# Patient Record
Sex: FEMALE | URBAN - METROPOLITAN AREA
[De-identification: names, ages, dates, MRNs, and addresses within clinical notes are randomized per-mention and may not be internally consistent; named-entity substitution may affect disease eponyms.]

---

## 2017-01-31 DIAGNOSIS — O26.90 PREGNANCY RELATED CONDITION, UNSPECIFIED TRIMESTER: Primary | ICD-10-CM

## 2017-02-09 ENCOUNTER — PRE VISIT (OUTPATIENT)
Dept: MATERNAL FETAL MEDICINE | Facility: CLINIC | Age: 37
End: 2017-02-09

## 2017-02-13 ENCOUNTER — HOSPITAL ENCOUNTER (OUTPATIENT)
Dept: ULTRASOUND IMAGING | Facility: CLINIC | Age: 37
Discharge: HOME | End: 2017-02-13
Attending: NURSE PRACTITIONER | Admitting: OBSTETRICS & GYNECOLOGY
Payer: MEDICAID

## 2017-02-13 ENCOUNTER — OFFICE VISIT (OUTPATIENT)
Dept: MATERNAL FETAL MEDICINE | Facility: CLINIC | Age: 37
End: 2017-02-13
Attending: NURSE PRACTITIONER
Payer: MEDICAID

## 2017-02-13 ENCOUNTER — OFFICE VISIT (OUTPATIENT)
Dept: INTERPRETER SERVICES | Facility: CLINIC | Age: 37
End: 2017-02-13

## 2017-02-13 DIAGNOSIS — O09.521 AMA (ADVANCED MATERNAL AGE) MULTIGRAVIDA 35+, FIRST TRIMESTER: Primary | ICD-10-CM

## 2017-02-13 DIAGNOSIS — O26.90 PREGNANCY RELATED CONDITION, UNSPECIFIED TRIMESTER: ICD-10-CM

## 2017-02-13 PROCEDURE — 96040 ZZH GENETIC COUNSELING, EACH 30 MINUTES: CPT | Mod: ZF | Performed by: GENETIC COUNSELOR, MS

## 2017-02-13 PROCEDURE — 40000791 ZZHCL STATISTIC VERIFI PRENATAL TRISOMY 21,18,13: Performed by: OBSTETRICS & GYNECOLOGY

## 2017-02-13 PROCEDURE — 76813 OB US NUCHAL MEAS 1 GEST: CPT

## 2017-02-13 PROCEDURE — T1013 SIGN LANG/ORAL INTERPRETER: HCPCS | Mod: U3,ZF

## 2017-02-13 PROCEDURE — 36415 COLL VENOUS BLD VENIPUNCTURE: CPT | Performed by: OBSTETRICS & GYNECOLOGY

## 2017-02-13 NOTE — MR AVS SNAPSHOT
"              After Visit Summary   2/13/2017    Joan Menezes    MRN: [id number]           Patient Information     Date Of Birth          1980        Visit Information        Provider Department      2/13/2017 8:45 AM Ara Augustin, JED [facility] Maternal Fetal Medicine [facility]        Today's Diagnoses     AMA (advanced maternal age) multigravida 35+, first trimester    -  1    Pregnancy related condition, unspecified trimester           Follow-ups after your visit        Future tests that were ordered for you today     Open Future Orders        Priority Expected Expires Ordered    Verifi Test by Saberr Routine  5/14/2017 2/13/2017            Who to contact     If you have questions or need follow up information about today's clinic visit or your schedule please contact [facility] MATERNAL FETAL MEDICINE [facility] directly at [phone].  Normal or non-critical lab and imaging results will be communicated to you by ModiFacehart, letter or phone within 4 business days after the clinic has received the results. If you do not hear from us within 7 days, please contact the clinic through ModiFacehart or phone. If you have a critical or abnormal lab result, we will notify you by phone as soon as possible.  Submit refill requests through FamilySkyline or call your pharmacy and they will forward the refill request to us. Please allow 3 business days for your refill to be completed.          Additional Information About Your Visit        ModiFacehart Information     FamilySkyline lets you send messages to your doctor, view your test results, renew your prescriptions, schedule appointments and more. To sign up, go to [email or web address] . Click on \"Log in\" on the left side of the screen, which will take you to the Welcome page. Then click on \"Sign up Now\" on the right side of the page.     You will be asked to enter the access code listed below, as well as some personal information. Please follow the directions " to create your username and password.     Your access code is: [email or web address]  Expires: 2017 10:03 AM     Your access code will  in 90 days. If you need help or a new code, please call your [facility] or [phone].        Care EveryWhere ID     This is your Care EveryWhere ID. This could be used by other organizations to access your [place] medical records  [id number]        Your Vitals Were     Last Period                   2016            Blood Pressure from Last 3 Encounters:   No data found for BP    Weight from Last 3 Encounters:   No data found for Wt              We Performed the Following     [facility] Genetic Counseling        Primary Care Provider    Md Other Clinic                Thank you!     Thank you for choosing [facility]  for your care. Our goal is always to provide you with excellent care. Hearing back from our patients is one way we can continue to improve our services. Please take a few minutes to complete the written survey that you may receive in the mail after your visit with us. Thank you!             Your Updated Medication List - Protect others around you: Learn how to safely use, store and throw away your medicines at www.disposemymeds.org.      Notice  As of 2017 10:03 AM    You have not been prescribed any medications.

## 2017-02-13 NOTE — PROGRESS NOTES
"Please see \"Imaging\" tab under \"Chart Review\" for details of today's US.    Gia Bautista, DO    "

## 2017-02-13 NOTE — MR AVS SNAPSHOT
After Visit Summary   2/13/2017    Joan Menezes    MRN: [id number]           Patient Information     Date Of Birth          1980        Visit Information        Provider Department      2/13/2017 10:00 AM Gia Bautista DO [facility] Maternal Fetal Medicine [facility]        Today's Diagnoses     AMA (advanced maternal age) multigravida 35+, first trimester    -  1       Follow-ups after your visit        Your next 10 appointments already scheduled     Mar 27, 2017  8:45 AM CDT   M US COMP with [id number]   [facility] Maternal Fetal Medicine Ultrasound - [facility])    [address]  [facility] [id number] [phone]           Wear comfortable clothes and leave your valuables at home.            Mar 27, 2017  9:15 AM CDT   Radiology MD with UR MFM MD   [facility] Maternal Fetal Medicine - [facility])    [address]  [facility] [address]   [phone]           Please arrive at the time given for your first appointment.  This visit is used internally to schedule the physician's time during your ultrasound.              Future tests that were ordered for you today     Open Future Orders        Priority Expected Expires Ordered    M US Comprehensive Single Routine  12/13/2017 2/13/2017            Who to contact     If you have questions or need follow up information about today's clinic visit or your schedule please contact [facility] MATERNAL FETAL MEDICINE [facility] directly at [phone].  Normal or non-critical lab and imaging results will be communicated to you by MyChart, letter or phone within 4 business days after the clinic has received the results. If you do not hear from us within 7 days, please contact the clinic through MyChart or phone. If you have a critical or abnormal lab result, we will notify you by phone as soon as possible.  Submit refill  "requests through SpectraScience or call your pharmacy and they will forward the refill request to us. Please allow 3 business days for your refill to be completed.          Additional Information About Your Visit        Kneebonehart Information     SpectraScience lets you send messages to your doctor, view your test results, renew your prescriptions, schedule appointments and more. To sign up, go to [email or web address] . Click on \"Log in\" on the left side of the screen, which will take you to the Welcome page. Then click on \"Sign up Now\" on the right side of the page.     You will be asked to enter the access code listed below, as well as some personal information. Please follow the directions to create your username and password.     Your access code is: [email or web address]  Expires: 2017 10:03 AM     Your access code will  in 90 days. If you need help or a new code, please call your [place] clinic or [phone].        Care EveryWhere ID     This is your Care EveryWhere ID. This could be used by other organizations to access your [place] medical records  [id number]        Your Vitals Were     Last Period                   2016            Blood Pressure from Last 3 Encounters:   No data found for BP    Weight from Last 3 Encounters:   No data found for Wt              We Performed the Following     Verifi Test by [facility]        Primary Care Provider    Md Other Clinic                Thank you!     Thank you for choosing [facility]  for your care. Our goal is always to provide you with excellent care. Hearing back from our patients is one way we can continue to improve our services. Please take a few minutes to complete the written survey that you may receive in the mail after your visit with us. Thank you!             Your Updated Medication List - Protect others around you: Learn how to safely use, store and throw away your medicines at www.disposemymeds.org.      Notice  As of " 2/13/2017 11:11 AM    You have not been prescribed any medications.

## 2017-02-13 NOTE — PROGRESS NOTES
[facility] Maternal [facility]etal [facility]  Genetic Counseling Consult    Patient: Joan Menezes YOB: 1980   Date of Service: 17      Joan Menezes was seen with the aid of a , along with her  Krish, at [facility] for genetic consultation to discuss the options for screening and testing for fetal chromosome abnormalities.  The indication for genetic counseling is advanced maternal age.        Impression/Plan:   1.  Joan had an ultrasound and blood draw for NIPT (Innatal test through [facility]).  Results are expected within 7-10 days, and will be available in [employer].  We will contact her with a  to discuss the results (detailed message on cell phone requested, per patient, also, Joan requests that we do disclose sex chromosome information. Joan also signed a consent for us to release results to her .  If she does not answer her phone, she would like us to leave her a detailed message and then to call Krish at [phone]), and a copy will be forwarded to the office of the referring OB provider.    2.  Maternal serum AFP (single marker screen) is recommended after 15 weeks to screen for open neural tube defects. A quad screen should not be performed.    3.  An 18-20 week comprehensive ultrasound is standard of care for all women 35 or older at delivery.    Pregnancy History:   /Parity:    Age at Delivery: 36 year old    2010:  (Daughter - Rachel), born in [place] [place]   ARDEN: 2017, by Last Menstrual Period  Gestational Age: 12w5d    No significant complications or exposures were reported in the current pregnancy.    Medical/Social History:   Joan s reported medical history is not expected to impact pregnancy management or risks to fetal development.  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  Her  also does kitchen food prep and similarly reports no exposures of concern.  They have been together for 3 years.  This is Krish's first baby.  They are both originally from [place].  Joan has lived her for 6 years and Krish for 10.  Both of their families are in [place].            Family History:   A three-generation pedigree was obtained, and is scanned under the  Media  tab.   The following significant findings were reported by Joan:    Childhood death was reported for 2 of Joan's brothers.  Joan reports that she has 6 brothers and 5 sisters.  2 of her brothers are twins and were reportedly born healthy but then both passed away at age 2.  This occurred ~40 years ago in [place].  Joan does not know further details.  We discussed that without a diagnosis/further details it is difficult to provide an accurate risk assessment to other family members.      Otherwise, the reported family history is negative for multiple miscarriages, stillbirths, birth defects, mental retardation, known genetic conditions, and consanguinity.       Carrier Screening:   The patient reports that she and the father of the pregnancy have [place] ancestry:    Hemoglobinopathies are autosomal recessive genetic conditions that occurs with increased frequency in individuals of this ancestry and carrier screening for this condition is available.  In addition,  screening in the state of [place] includes these conditions.    Expanded carrier screening for mutations in a large panel of genes associated with autosomal recessive conditions including cystic fibrosis, spinal muscular atrophy, and others, is now available.      The patient has had previous carrier screening for hemoglobinopathies and cystic fibrosis, the results of which were normal.  A copy of the report was available for our review today.       Risk Assessment for Chromosome Conditions:   We explained that the risk  for fetal chromosome abnormalities increases with maternal age. We discussed specific features of common chromosome abnormalities, including Down syndrome, trisomy 13, trisomy 18, and sex chromosome trisomies.      - At age 35 at midtrimester, the risk to have a baby with Down syndrome is 1 in 274.     - At age 36 at midtrimester, the risk to have a baby with any chromosome abnormality is 1 in 135.          Testing Options:   We discussed the following options:   First trimester screening    First trimester ultrasound with nuchal translucency and nasal bone assessments, maternal plasma hCG, CRUZ-A, and AFP measurement    Screens for fetal trisomy 21, trisomy 13, and trisomy 18    Cannot screen for open neural tube defects; maternal serum AFP after 15 weeks is recommended     Non-invasive Prenatal Testing (NIPT)    Maternal plasma cell-free DNA testing; first trimester ultrasound with nuchal translucency and nasal bone assessment is recommended, when appropriate    Screens for fetal trisomy 21, trisomy 13, trisomy 18, and sex chromosome aneuploidy    Cannot screen for open neural tube defects; maternal serum AFP after 15 weeks is recommended     Chorionic villus sampling (CVS)    Invasive procedure typically performed in the first trimester by which placental villi are obtained for the purpose of chromosome analysis and/or other prenatal genetic analysis    Diagnostic results; >99% sensitivity for fetal chromosome abnormalities    Cannot test for open neural tube defects; maternal serum AFP after 15 weeks is recommended     Genetic Amniocentesis    Invasive procedure typically performed in the second trimester by which amniotic fluid is obtained for the purpose of chromosome analysis and/or other prenatal genetic analysis    Diagnostic results; >99% sensitivity for fetal chromosome abnormalities    AFAFP measurement tests for open neural tube defects     Comprehensive (Level II) ultrasound: Detailed ultrasound  performed between 18-22 weeks gestation to screen for major birth defects and markers for aneuploidy.        We reviewed the benefits and limitations of this testing.  Screening tests provide a risk assessment specific to the pregnancy for certain fetal chromosome abnormalities, but cannot definitively diagnose or exclude a fetal chromosome abnormality.  Follow-up genetic counseling and consideration of diagnostic testing is recommended with any abnormal screening result.     Diagnostic tests carry inherent risks- including risk of miscarriage- that require careful consideration.  These tests can detect fetal chromosome abnormalities with greater than 99% certainty.  Results can be compromised by maternal cell contamination or mosaicism, and are limited by the resolution of cytogenetic G-banding technology.  There is no screening nor diagnostic test that can detect all forms of birth defects or mental disability.     It was a pleasure to be involved with Joan s care. Face-to-face time of the meeting was 60 minutes.    Ara Augustin, [facility]  Certified Genetic Counselor  Pager: [phone]

## 2017-02-20 ENCOUNTER — TELEPHONE (OUTPATIENT)
Dept: MATERNAL FETAL MEDICINE | Facility: CLINIC | Age: 37
End: 2017-02-20

## 2017-02-20 NOTE — TELEPHONE ENCOUNTER
"I contacted Joan with normal \"Innatal\" free fetal DNA screening results -no aneuploidy detected for chromosomes 13, 18, 21, X or Y. These normal results suggest the likelihood of fetal Down syndrome, trisomy 13, or trisomy 18 is very low. Results consistent with two sex chromosomes (XX) (See scanned report under lab tab in epic). Sex chromosome information was disclosed.     These results are reported to have the following sensitivities: Down syndrome- 99%, trisomy 18- 98%, and trisomy 13- 98% with greater than 99% specificity. While this test is a highly accurate screen, it does not replace the diagnostic capabilities of standard prenatal diagnostic tests such as amniocentesis and CVS. Joan indicated her understanding and currently declines prenatal diagnosis.     Plan:  Level II ultrasound is recommended, given advanced maternal age, this has been scheduled in [facility] on 3/27/17. MSAFP is the appropriate second trimester screening test for open neural tube defects; the maternal quad screen is not indicated.      Daphnie Augustin MS [facility]  Certified Genetic Counselor  Maternal Fetal Medicine  [facility]  Voice Mail:  [phone]  E-Mail:  [email or web address]  Pager:  [phone]    "

## 2017-02-21 LAB — LAB SCANNED RESULT: NORMAL

## 2017-02-28 ENCOUNTER — HOSPITAL ENCOUNTER (OUTPATIENT)
Facility: CLINIC | Age: 37
End: 2017-02-28
Payer: COMMERCIAL

## 2017-03-27 ENCOUNTER — HOSPITAL ENCOUNTER (OUTPATIENT)
Dept: ULTRASOUND IMAGING | Facility: CLINIC | Age: 37
Discharge: HOME | End: 2017-03-27
Attending: OBSTETRICS & GYNECOLOGY | Admitting: OBSTETRICS & GYNECOLOGY
Payer: MEDICAID

## 2017-03-27 ENCOUNTER — OFFICE VISIT (OUTPATIENT)
Dept: MATERNAL FETAL MEDICINE | Facility: CLINIC | Age: 37
End: 2017-03-27
Attending: OBSTETRICS & GYNECOLOGY
Payer: MEDICAID

## 2017-03-27 DIAGNOSIS — O09.521 AMA (ADVANCED MATERNAL AGE) MULTIGRAVIDA 35+, FIRST TRIMESTER: ICD-10-CM

## 2017-03-27 DIAGNOSIS — O09.522 AMA (ADVANCED MATERNAL AGE) MULTIGRAVIDA 35+, SECOND TRIMESTER: Primary | ICD-10-CM

## 2017-03-27 PROCEDURE — 76811 OB US DETAILED SNGL FETUS: CPT

## 2017-03-27 NOTE — MR AVS SNAPSHOT
"              After Visit Summary   3/27/2017    Joan Soto    MRN: [id number]           Patient Information     Date Of Birth          1980        Visit Information        Provider Department      3/27/2017 9:15 AM Praneeth Roy MD [facility] Maternal Fetal Medicine [facility]        Today's Diagnoses     AMA (advanced maternal age) multigravida 35+, second trimester    -  1       Follow-ups after your visit        Who to contact     If you have questions or need follow up information about today's clinic visit or your schedule please contact [facility] MATERNAL FETAL MEDICINE [facility] directly at [phone].  Normal or non-critical lab and imaging results will be communicated to you by MyChart, letter or phone within 4 business days after the clinic has received the results. If you do not hear from us within 7 days, please contact the clinic through iTraff Technologyhart or phone. If you have a critical or abnormal lab result, we will notify you by phone as soon as possible.  Submit refill requests through Sagence or call your pharmacy and they will forward the refill request to us. Please allow 3 business days for your refill to be completed.          Additional Information About Your Visit        MyChart Information     Sagence lets you send messages to your doctor, view your test results, renew your prescriptions, schedule appointments and more. To sign up, go to [email or web address] . Click on \"Log in\" on the left side of the screen, which will take you to the Welcome page. Then click on \"Sign up Now\" on the right side of the page.     You will be asked to enter the access code listed below, as well as some personal information. Please follow the directions to create your username and password.     Your access code is: [email or web address]  Expires: 2017 11:03 AM     Your access code will  in 90 days. If you need help or a new code, please call your [place] clinic or [phone].      "   Care EveryWhere ID     This is your Care EveryWhere ID. This could be used by other organizations to access your [place] medical records  [id number]        Your Vitals Were     Last Period                   11/16/2016            Blood Pressure from Last 3 Encounters:   No data found for BP    Weight from Last 3 Encounters:   No data found for Wt              Today, you had the following     No orders found for display       Primary Care Provider    Md Other Clinic                Thank you!     Thank you for choosing [facility]  for your care. Our goal is always to provide you with excellent care. Hearing back from our patients is one way we can continue to improve our services. Please take a few minutes to complete the written survey that you may receive in the mail after your visit with us. Thank you!             Your Updated Medication List - Protect others around you: Learn how to safely use, store and throw away your medicines at www.disposemymeds.org.      Notice  As of 3/27/2017  9:20 AM    You have not been prescribed any medications.

## 2017-06-18 ENCOUNTER — HOSPITAL ENCOUNTER (OUTPATIENT)
Facility: CLINIC | Age: 37
Discharge: HOME | End: 2017-06-19
Attending: ADVANCED PRACTICE MIDWIFE | Admitting: ADVANCED PRACTICE MIDWIFE
Payer: COMMERCIAL

## 2017-06-18 DIAGNOSIS — N30.01 ACUTE CYSTITIS WITH HEMATURIA: Primary | ICD-10-CM

## 2017-06-18 RX ORDER — PRENATAL VIT/IRON FUM/FOLIC AC 27MG-0.8MG
1 TABLET ORAL DAILY
COMMUNITY

## 2017-06-18 RX ORDER — PRENATAL VIT/IRON FUM/FOLIC AC 27MG-0.8MG
1 TABLET ORAL DAILY
Status: ON HOLD | COMMUNITY
End: 2017-06-18

## 2017-06-18 RX ORDER — FERROUS FUMARATE 324(106)MG
106 TABLET ORAL DAILY
COMMUNITY

## 2017-06-18 NOTE — IP AVS SNAPSHOT
MRN:[id number]                      After Visit Summary   6/18/2017    Joan Soto    MRN: [id number]           Thank you!     Thank you for choosing [place] for your care. Our goal is always to provide you with excellent care. Hearing back from our patients is one way we can continue to improve our services. Please take a few minutes to complete the written survey that you may receive in the mail after you visit with us. Thank you!        Patient Information     Date Of Birth          1980        Designated Caregiver       Most Recent Value    Caregiver    Will someone help with your care after discharge? no      About your hospital stay     You were admitted on:  June 18, 2017 You last received care in the:  [facility]    You were discharged on:  June 19, 2017       Who to Call     For medical emergencies, please call 911.  For non-urgent questions about your medical care, please call your primary care provider or clinic, None          Attending Provider     Provider Specialty    Willie Rey APRN CNM [facility]       Primary Care Provider    Physician No Ref-Primary      Further instructions from your care team       Discharge Instructions for Undelivered Patients    Diet:  * Drink 8 to 12 glasses of liquids (milk, juice, water) every day  * You may eat meals and snacks.    Activity:  * Count fetal kicks every day (see handout).  * Call your doctor or nurse midwife if your baby is moving less than usual.    Call your provider if you notice:  * Swelling in your face or increased swelling in your hands or legs.  * Headaches that are not relieved by Tylenol (acetaminophen).  * Changes in your vision (blurring; seeing spots or stars).  * Nausea (sick to your stomach) and vomiting (throwing up).  * Weight gain of 5 pounds per week.  * Heartburn that doesn't go away.  * Signs of bladder infection: Pain when you urinate (use the toilet), needing to go more often or more  "urgently.  * The bag of uribe (membrane) breaks, or you notice leaking in your underwear.  * Bright red blood in your underwear.  * Abdominal (lower belly) or stomach pain.  * For second + baby: Contractions (tightenings) less than 10 minutes apart for one hour or more.  * Increase or change in vaginal discharge (note the color and amount).           Pending Results     Date and Time Order Name Status Description    2017 0031 Chlamydia trachomatis PCR In process     2017 0031 Neisseria gonorrhoea PCR In process     2017 0031 Group B strep PCR In process             Statement of Approval     Ordered          17 0302  I have reviewed and agree with all the recommendations and orders detailed in this document.  EFFECTIVE NOW     Approved and electronically signed by:  Willie Rey APRN CNM             Admission Information     Date & Time Provider Department Dept. Phone    2017 Willie Rey APRN CNM [facility] [phone]      Your Vitals Were     Blood Pressure Pulse Temperature Respirations Last Period       108/64 95 98.6  F (37  C) (Oral) 18 2016       [employer] Information     [employer] lets you send messages to your doctor, view your test results, renew your prescriptions, schedule appointments and more. To sign up, go to [email or web address] . Click on \"Log in\" on the left side of the screen, which will take you to the Welcome page. Then click on \"Sign up Now\" on the right side of the page.     You will be asked to enter the access code listed below, as well as some personal information. Please follow the directions to create your username and password.     Your access code is: [email or web address]  Expires: 2017  3:06 AM     Your access code will  in 90 days. If you need help or a new code, please call your [place] clinic or [phone].        Care EveryWhere ID     This is your Care EveryWhere ID. This could be used by other organizations to access your [facility] " medical records  [id number]           Review of your medicines      UNREVIEWED medicines. Ask your doctor about these medicines        Dose / Directions    ferrous fumarate 324 (106 FE) MG Tabs tablet   Commonly known as:  FERRETTS        Dose:  106 mg   Take 106 mg by mouth daily   Refills:  0       prenatal multivitamin  plus iron 27-0.8 MG Tabs per tablet        Dose:  1 tablet   Take 1 tablet by mouth daily   Refills:  0       VITAMIN D (CHOLECALCIFEROL) PO        Dose:  2000 Units   Take 2,000 Units by mouth daily   Refills:  0       ZANTAC PO        Dose:  150 mg   Take 150 mg by mouth   Refills:  0                Protect others around you: Learn how to safely use, store and throw away your medicines at www.disposemymeds.org.             Medication List: This is a list of all your medications and when to take them. Check marks below indicate your daily home schedule. Keep this list as a reference.      Medications           Morning Afternoon Evening Bedtime As Needed    ferrous fumarate 324 (106 FE) MG Tabs tablet   Commonly known as:  FERRETTS   Take 106 mg by mouth daily                                prenatal multivitamin  plus iron 27-0.8 MG Tabs per tablet   Take 1 tablet by mouth daily                                VITAMIN D (CHOLECALCIFEROL) PO   Take 2,000 Units by mouth daily                                ZANTAC PO   Take 150 mg by mouth                                          More Information        Recuento de patadas  Es normal que le preocupe la viky de mi bebé. Para saber si el bebé está seun, usted puede anotar las veces que usted siente demario pataditas en un registro de movimientos todos los días. Normalmente es posible sentir que el bebé se mueve a partir de la semana 20 del embarazo. No olvide llevar los registros de los movimientos del bebé a todas las citas que tenga con mi proveedor de atención médica.     Cómo contar los movimientos    Escoja dallas hora cuando el bebé esté activo, carlita  por ejemplo después de dallas comida.    Siéntese cómodamente o acuéstese de lado.    La primera vez que el bebé se mueva, anote la hora.    Cuente cada movimiento hasta que el bebé se haya movido 10 veces. (Josephine puede llevar entre 20 minutos y 2 horas.)    Si el bebé no se ha movido 4 veces en 1 hora, dése dallas palmadita en el abdomen para despertarlo.    Anote la hora en que sienta el décimo movimiento del bebé.    Trate de hacer esto a la misma hora todos los días.  Cuándo debe llamar al proveedor de atención médica  Llame a mi proveedor de atención médica en el acto en cualquiera de los siguientes casos:     Si el bebé se mueve menos de 10 veces en 1 horas mientras usted está llevando la cuenta de las pataditas.    Si el bebé se mueve con mucha menos frecuencia que en días anteriores.    Si usted no ha sentido movimientos del bebé en todo el día.    [id number] Bryson Johnson, [address] [place] [id number]. Todos los derechos reservados. Esta información no pretende sustituir la atención médica profesional. Sólo mi médico puede diagnosticar y tratar un problema de viky.

## 2017-06-18 NOTE — IP AVS SNAPSHOT
[facility]    [address] [place] [address]    Phone:  [phone]                                       After Visit Summary   6/18/2017    Joan Soto    MRN: [id number]           After Visit Summary Signature Page     I have received my discharge instructions, and my questions have been answered. I have discussed any challenges I see with this plan with the nurse or doctor.    ..........................................................................................................................................  Patient/Patient Representative Signature      ..........................................................................................................................................  Patient Representative Print Name and Relationship to Patient    ..................................................               ................................................  Date                                            Time    ..........................................................................................................................................  Reviewed by Signature/Title    ...................................................              ..............................................  Date                                                            Time

## 2017-06-19 VITALS
RESPIRATION RATE: 18 BRPM | HEART RATE: 95 BPM | DIASTOLIC BLOOD PRESSURE: 64 MMHG | TEMPERATURE: 98.6 F | SYSTOLIC BLOOD PRESSURE: 108 MMHG

## 2017-06-19 PROBLEM — Z34.80 SUPERVISION OF OTHER NORMAL PREGNANCY, ANTEPARTUM: Status: ACTIVE | Noted: 2017-06-19

## 2017-06-19 LAB
ALBUMIN UR-MCNC: 10 MG/DL
APPEARANCE UR: CLEAR
BACTERIA #/AREA URNS HPF: ABNORMAL /HPF
BILIRUB UR QL STRIP: NEGATIVE
C TRACH DNA SPEC QL NAA+PROBE: NORMAL
COLOR UR AUTO: YELLOW
GLUCOSE UR STRIP-MCNC: NEGATIVE MG/DL
GP B STREP DNA SPEC QL NAA+PROBE: NORMAL
HGB UR QL STRIP: ABNORMAL
KETONES UR STRIP-MCNC: NEGATIVE MG/DL
LEUKOCYTE ESTERASE UR QL STRIP: NEGATIVE
MICRO REPORT STATUS: NORMAL
MUCOUS THREADS #/AREA URNS LPF: PRESENT /LPF
N GONORRHOEA DNA SPEC QL NAA+PROBE: NORMAL
NITRATE UR QL: NEGATIVE
PH UR STRIP: 6.5 PH (ref 5–7)
RBC #/AREA URNS AUTO: 8 /HPF (ref 0–2)
SP GR UR STRIP: 1.02 (ref 1–1.03)
SPECIMEN SOURCE: NORMAL
SQUAMOUS #/AREA URNS AUTO: 2 /HPF (ref 0–1)
URN SPEC COLLECT METH UR: ABNORMAL
UROBILINOGEN UR STRIP-MCNC: NORMAL MG/DL (ref 0–2)
WBC #/AREA URNS AUTO: 1 /HPF (ref 0–2)
WET PREP SPEC: NORMAL

## 2017-06-19 PROCEDURE — 81001 URINALYSIS AUTO W/SCOPE: CPT | Performed by: ADVANCED PRACTICE MIDWIFE

## 2017-06-19 PROCEDURE — 99214 OFFICE O/P EST MOD 30 MIN: CPT | Mod: 25

## 2017-06-19 PROCEDURE — 25000132 ZZH RX MED GY IP 250 OP 250 PS 637: Performed by: ADVANCED PRACTICE MIDWIFE

## 2017-06-19 PROCEDURE — 59025 FETAL NON-STRESS TEST: CPT

## 2017-06-19 PROCEDURE — 87653 STREP B DNA AMP PROBE: CPT | Performed by: ADVANCED PRACTICE MIDWIFE

## 2017-06-19 PROCEDURE — 87491 CHLMYD TRACH DNA AMP PROBE: CPT | Performed by: ADVANCED PRACTICE MIDWIFE

## 2017-06-19 PROCEDURE — 87210 SMEAR WET MOUNT SALINE/INK: CPT | Performed by: ADVANCED PRACTICE MIDWIFE

## 2017-06-19 PROCEDURE — 87591 N.GONORRHOEAE DNA AMP PROB: CPT | Performed by: ADVANCED PRACTICE MIDWIFE

## 2017-06-19 RX ORDER — ACETAMINOPHEN 325 MG/1
650-975 TABLET ORAL EVERY 4 HOURS PRN
Status: DISCONTINUED | OUTPATIENT
Start: 2017-06-19 | End: 2017-06-19 | Stop reason: HOSPADM

## 2017-06-19 RX ORDER — LEVOFLOXACIN 750 MG/1
750 TABLET, FILM COATED ORAL DAILY
Qty: 5 TABLET | Refills: 0 | Status: SHIPPED | OUTPATIENT
Start: 2017-06-19

## 2017-06-19 RX ORDER — ONDANSETRON 2 MG/ML
4 INJECTION INTRAMUSCULAR; INTRAVENOUS EVERY 6 HOURS PRN
Status: DISCONTINUED | OUTPATIENT
Start: 2017-06-19 | End: 2017-06-19 | Stop reason: HOSPADM

## 2017-06-19 RX ADMIN — ACETAMINOPHEN 975 MG: 325 TABLET, FILM COATED ORAL at 00:47

## 2017-06-19 NOTE — PLAN OF CARE
Problem: Goal Outcome Summary  Goal: Goal Outcome Summary  Outcome: No Change  Labor Eval Admit Note  Joan Soto  MRN: [id number]  Gestational Age: 30w5d       Joan Soto presents for vaginal pain and pain in the buttocks.  States having this pain and swelling since frieday.  Bleeding not present.  Denies LOF or other vaginal DC. Patient denies any trauma, falls or intercourse that could have caused this pain.     Willie Rey CNM notified of arrival and condition.  Oriented patient to surroundings. Call light within reach.      FHT: 145  NST: Appropriate for Gestational Age.  Uterine Assessment: Contractions: approximately 2-3 per hour     Plan:  -initial NST, then fetal/uterine monitoring per MD/patient plan.  -Sterile vaginal exam/sterile speculum exam with wet prep, GC chlam, UTox and GBS.

## 2017-06-19 NOTE — PLAN OF CARE
Problem: Goal Outcome Summary  Goal: Goal Outcome Summary  Outcome: No Change  Joan Soto admitted for evaluation and treatment of  Labor/ vaginal pain/ pain on buttocks .    VitalsBlood pressure 108/64, pulse 95, temperature 98.6  F (37  C), temperature source Oral, resp. rate 18, last menstrual period 2016.  , fetal status: Appropriate for Gestational Age.  Labs were done, results: + for bladder infection, GC/Chlam pending, GBS pending, wet prep negative.    Medications given during stay: tylenol for pain, medications prescribed at discharge: Antibiotic for bladder infection.    Written instructions given to patient.  Additional documents provided: kick count in [place].  Copy in electronic medical record.  Discharged Home undelivered at 0317 in stable condition.  Follow up with primary care provider this week for Boil on left buttocks        NST Documentation:  Baseline: 135  Accelerations: Present  Decelerations: Variable decelerations  Interpretation: equivocal

## 2017-06-19 NOTE — DISCHARGE INSTRUCTIONS
Discharge Instructions for Undelivered Patients    Diet:  * Drink 8 to 12 glasses of liquids (milk, juice, water) every day  * You may eat meals and snacks.    Activity:  * Count fetal kicks every day (see handout).  * Call your doctor or nurse midwife if your baby is moving less than usual.    Call your provider if you notice:  * Swelling in your face or increased swelling in your hands or legs.  * Headaches that are not relieved by Tylenol (acetaminophen).  * Changes in your vision (blurring; seeing spots or stars).  * Nausea (sick to your stomach) and vomiting (throwing up).  * Weight gain of 5 pounds per week.  * Heartburn that doesn't go away.  * Signs of bladder infection: Pain when you urinate (use the toilet), needing to go more often or more urgently.  * The bag of uribe (membrane) breaks, or you notice leaking in your underwear.  * Bright red blood in your underwear.  * Abdominal (lower belly) or stomach pain.  * For second + baby: Contractions (tightenings) less than 10 minutes apart for one hour or more.  * Increase or change in vaginal discharge (note the color and amount).

## 2017-06-19 NOTE — H&P
HOSPITAL TRIAGE NOTE  ===================    CHIEF COMPLAINT  ========================  Joan Soto is a 36 year old patient presenting today at 30w5d for evaluation of abdominal and pelvic pain.    Patient's last menstrual period was 2016.  Estimated Date of Delivery: Aug 23, 2017     HPI  ==================   Pt presents to  reporting lower abdominal pain and intermittent vaginal pain since yesterday. Also reports dysuria and mild cramping. Denies any abnormal vaginal discharge, itching, irritation, or odor. + fetal movement. Denies leaking of fluid or vaginal bleeding. Denies any complications thus far in pregnancy. Also reports significant pin on L buttock, difficulty walking and worse with sitting.    Prenatal record and labs reviewed from [facility], through Care Everywhere.    CONTRACTIONS: mild and cramping  ABDOMINAL PAIN: moderate  FETAL MOVEMENT: active    VAGINAL BLEEDING: none  RUPTURE OF MEMBRANES: no  PELVIC PAIN: cramping and mild    PREGNANCY COMPLICATIONS: none  OTHER: AMA, + TB, anemia    REVIEW OF SYSTEMS  =====================  C: NEGATIVE for fever, chills  I: NEGATIVE for worrisome rashes, moles or lesions  E: NEGATIVE for vision changes or irritation  R: NEGATIVE for significant cough or SOB  CV: NEGATIVE for chest pain, palpitations or varicosities  GI: positive for lower abdominal pain and pain on L buttock; NEGATIVE for nausea, abdominal pain, heartburn, or change in bowel habits;   : positive for dysuria  M: NEGATIVE for significant arthralgias or myalgia  N: NEGATIVE for headache, weakness, dizziness or paresthesias  P: NEGATIVE for changes in mood or affect    PROBLEM LIST  ===============  Patient Active Problem List    Diagnosis Date Noted     Labor and delivery, indication for care 2017     Priority: Medium       HISTORIES  ==============  ALLERGIES:    No Known Allergies  PAST MEDICAL HISTORY  Past Medical History:   Diagnosis Date      Anemia      SOCIAL HISTORY  Social History     Social History     Marital status: Single     Spouse name: N/A     Number of children: N/A     Years of education: N/A     Occupational History     Not on file.     Social History Main Topics     Smoking status: Not on file     Smokeless tobacco: Not on file     Alcohol use No     Drug use: No     Sexual activity: Not on file     Other Topics Concern     Not on file     Social History Narrative     FAMILY HISTORY  No family history on file.  OB HISTORY  Obstetric History       T0      L1     SAB0   TAB0   Ectopic0   Multiple0   Live Births0       # Outcome Date GA Lbr Erlin/2nd Weight Sex Delivery Anes PTL Lv   2 Current            1 Para         SOPHIA        Prenatal Labs: O pos, HIV NR, Hep B NR, Vit D 33, , H/H 9.3/29.5, RPR NR, GC/CT neg, rubella and varicella immune  ULTRASOUND(s) reviewed: [facility]    EXAM  ============  LMP 2016  GENERAL APPEARANCE: healthy, alert and no distress  RESP: lungs clear to auscultation - no rales, rhonchi or wheezes  CV: regular rates and rhythm, normal S1 S2, no S3 or S4 and no murmur,and no varicosities  ABDOMEN: tender to palpation lower quadrants; soft, no epigastric pain  SKIN: no suspicious lesions or rashes  NEURO: Denies headache, blurred vision, other vision changes  PSYCH: mentation appears normal. and affect normal/bright  MS/ LEGS: No edema    Pelvic Exam:  Vulva: No external lesions, normal hair distribution, no adenopathy  Vagina: tenderness noted on exam; moist, pink, white discharge, well rugated, no lesions  Cervix: cervix posterior  Uterus: gravid  Ovaries: No mass, non-tender, mobile  Rectal exam: 2 cm mass palpated, warmth, tender on inner L buttock, no redness      CONTRACTIONS: irreg and mild   FETAL HEART TONES: continuous EFM- baseline 145 with moderate variability and positive accelerations. No decelerations.  NST: REACTIVE  EFW:3.75#    PELVIC EXAM: posterior, difficult to assess due to  patient's discomfort  KOCH SCORE: n/a  PRESENTATION: VERTEX  BLOOD: no  DISCHARGE: white to clear discharge    ROM: no  AMNISURE: not done    LABS: UA, UC, Wet prep, GBS and GC/ Chlamydia  Lab results reviewed- pending    DIAGNOSIS  ============  30w5d seen on the [facility] Triage for abdominal and pelvic pain  NST: REACTIVE  Fetal Heart rate tracing:category one  Suspected boil    PLAN  ============  Continue observation until labs results reviewed  Tylenol prn for pain and warm compress    FORTUNATO PickeringM

## 2017-06-19 NOTE — DISCHARGE SUMMARY
HOSPITAL TRIAGE DISCHARGE NOTE  ===================    EXAM  ============  /64  Pulse 95  Temp 98.6  F (37  C) (Oral)  Resp 18  LMP 11/16/2016  CONTRACTIONS: irreg and mild  FETAL HEART TONES: continuous EFM- baseline 135 with moderate variability and positive accelerations. No decelerations.    Lab results reviewed- wet prep negative, UA + blood, + protein  Urine culture pending, GC/CT pending     DIAGNOSIS  ============  30w5d seen on the [facility] Triage suspected cystitis  NST: REACTIVE  Fetal Heart rate tracing:category one  Boil on L buttock    PLAN  ============  Discharge to home with PTL instructions per discharge instruction form  Call or return to the [facility] with contractions, cramping, abdominal or pelvic pain, vaginal bleeding, leaking fluid or decreased fetal movement.  Information given for boil. Encouraged to follow-up as needed with primary care.  Continue with prenatal care as scheduled.  Prescriptions- rx sent for antibiotics    FORTUNATO Pickering CNM        Antibiotic to [facility]

## 2019-12-06 NOTE — TELEPHONE ENCOUNTER
RECORDS RECEIVED FROM: N/A   DATE RECEIVED: 1/30/2020   NOTES STATUS DETAILS   OFFICE NOTE from referring provider  N/A    OFFICE NOTE from other specialist   Care Everywhere 7/22/19 Office visit with Dr. Jose Centeno ([facility])   DISCHARGE SUMMARY from hospital  Care Everywhere 6/23/17 ([facility])   DISCHARGE REPORT from the ER Care Everywhere 7/9/19 ([facility])   OPERATIVE REPORT  Care Everywhere 6/24/17 Procedure: Incision and Drainage Perirectal Abscess with Dr. Raulito Maciel  ([facility])    MEDICATION LIST Care Everywhere    LABS     PFC TESTING N/A    ANAL PAP N/A    BIOPSIES/PATHOLOGY RELATED TO DIAGNOSIS N/A    DIAGNOSTIC PROCEDURES     COLONOSCOPY N/A    UPPER ENDOSCOPY (EGD) N/A    FLEX SIGMOIDOSCOPY  N/A    ERCP N/A    IMAGING (DISC & REPORT)      CT  N/A    MRI N/A    XRAY N/A    ULTRASOUND (ENDOANAL/ENDORECTAL) N/A

## 2020-01-30 ENCOUNTER — PRE VISIT (OUTPATIENT)
Dept: SURGERY | Facility: CLINIC | Age: 40
End: 2020-01-30

## 2020-01-30 ENCOUNTER — OFFICE VISIT (OUTPATIENT)
Dept: SURGERY | Facility: CLINIC | Age: 40
End: 2020-01-30
Payer: COMMERCIAL

## 2020-01-30 VITALS
HEART RATE: 62 BPM | SYSTOLIC BLOOD PRESSURE: 108 MMHG | OXYGEN SATURATION: 98 % | DIASTOLIC BLOOD PRESSURE: 60 MMHG | WEIGHT: 147 LBS

## 2020-01-30 DIAGNOSIS — K60.30 ANAL FISTULA: Primary | ICD-10-CM

## 2020-01-30 ASSESSMENT — PAIN SCALES - GENERAL: PAINLEVEL: NO PAIN (0)

## 2020-01-30 NOTE — LETTER
"2020       RE: Joan Soto  [address]  [facility] [address]     Dear Colleague,    Thank you for referring your patient, Joan Soto, to the [facility] COLON AND RECTAL SURGERY at [facility]. Please see a copy of my visit note below.    Colon and Rectal Surgery Consult Clinic Note    Date: 2020     Referring provider:  Referred Self, MD  No address on file     RE: Joan Soto  : 1980  JULIEN: 2020    Joan Soto is a very pleasant 39 year old female without a significant past medical history with a recent diagnosis of perirectal abscess.  Given these findings they were subsequently sent to the Colon and Rectal Surgery Clinic for an opinion on this and a new patient consultation.     Ms. Riri Soto was seen with an in person  and reports that 2 1/2 years ago she developed a painful left sided perirectal fluid collection. She underwent I&D of a large left sided ischiorectal abscess with no visibile fistula tract with Dr.Isaac Maciel.  Her symptoms improved until 6 months ago when she developed similar perirectal swelling and pain, and required another incision and drainage in the clinic setting.  The patient reports that currently she has no pain or fevers but will have some bloody drainage from the prior I&D site.  She reports \"normal\" BMs daily, no pain with BMs, no know family history of Crohn's or ulcerative colitis. No fecal incontinence. She has had one vaginal birth.   She has never had a colonoscopy.    Physical Examination:  /60   Pulse 62   Wt 147 lb   SpO2 98%   General: Alert, no acute distress    Perianal external examination: Exam was performed by Candelaria Vang NP chaperoned by Jamee Gan, PHILIPPE student and JANUARY Stapleton  Perianal skin: No excoriation or lichenification.  Left posterior scaring and chronic " appearing fistula opening present with a small amount of blood. This can be probed toward the anal opening a few cm.  Lesions: No evidence of an external lesion, nodularity, or induration in the perianal region.  Eversion of buttocks: There was not evidence of an anal fissure. Details: N/A.  Skin tags or external hemorrhoids: None.  Digital rectal examination: Was performed.   Sphincter tone: Good.  Palpable lesions: No.  Other: None.  Bimanual examination: was not performed    Anoscopy: Was performed.   Hemorrhoids: No significant internal hemorrhoids.  Lesions: No  No internal tract opening identified      Assessment/Plan: 39 year old female without a significant past medical history with diagnosis of perianal fistula.     Throughout the visit Ms. Riri Soto appeared to not understand the conversation despite multiple explanations and use of a model.  She continued to ask the same questions multiple times and repeat back inaccurate information after explanation.  Potential misunderstanding due to language barrier and meaning lost during interpretation. Attempted to explain to patient that her symptoms of recurrent perianal abscesses and persistent drainage from the site are likely the result of a fistula.  Discussed surgical intervention with fistulotomy versus Seton drain placement.  Surgery is not necessary but without the treatment she will likely continue to have drainage from the site and potentially develop another abscess that would require another I&D.  She would like to think about the treatment and return and talk with a surgeon, which I think is reasonable as I do not think she fully understood our conversation today despite use of an .       Medical history:  Past Medical History:   Diagnosis Date     Anemia        Surgical history:  No past surgical history on file.    Problem list:    Patient Active Problem List    Diagnosis Date Noted     Labor and delivery, indication for care  06/19/2017     Priority: Medium     Supervision of other normal pregnancy, antepartum 06/19/2017     Priority: Medium     6/19/17- seen on BP with abd pain. GC/ chlam- N/N. UC____, GBS_____         Medications:  Current Outpatient Medications   Medication Sig Dispense Refill     ferrous fumarate (FERRETTS) 324 (106 FE) MG TABS tablet Take 106 mg by mouth daily       levofloxacin (LEVAQUIN) 750 MG tablet Take 1 tablet (750 mg) by mouth daily (Patient not taking: Reported on 1/30/2020) 5 tablet 0     Prenatal Vit-Fe Fumarate-FA (PRENATAL MULTIVITAMIN  PLUS IRON) 27-0.8 MG TABS per tablet Take 1 tablet by mouth daily       RaNITidine HCl (ZANTAC PO) Take 150 mg by mouth       VITAMIN D, CHOLECALCIFEROL, PO Take 2,000 Units by mouth daily         Allergies:  Allergies   Allergen Reactions     Aluminum Itching       Family history:  No family history on file.    Social history:  Social History     Tobacco Use     Smoking status: Never Smoker     Smokeless tobacco: Never Used   Substance Use Topics     Alcohol use: No    Marital status: single.      Nursing Notes:   Joselito Montenegro, EMT  1/30/2020  9:30 AM  Signed                   Jamee Gan, Student NP on 1/30/2020 at 11:37 AM    Total face to face time was 30 minutes, >50% counseling.    FORTUNATO Astudillo, NP-C  Colon and Rectal Surgery   [facility]    This note was created using speech recognition software and may contain unintended word substitutions.

## 2020-01-30 NOTE — PROGRESS NOTES
"Colon and Rectal Surgery Consult Clinic Note    Date: 2020     Referring provider:  Referred Self, MD  No address on file     RE: Joan Soto  : 1980  JULIEN: 2020    Joan Soto is a very pleasant 39 year old female without a significant past medical history with a recent diagnosis of perirectal abscess.  Given these findings they were subsequently sent to the Colon and Rectal Surgery Clinic for an opinion on this and a new patient consultation.     Ms. Riri Soto was seen with an in person  and reports that 2 1/2 years ago she developed a painful left sided perirectal fluid collection. She underwent I&D of a large left sided ischiorectal abscess with no visibile fistula tract with Dr.Isaac Maciel.  Her symptoms improved until 6 months ago when she developed similar perirectal swelling and pain, and required another incision and drainage in the clinic setting.  The patient reports that currently she has no pain or fevers but will have some bloody drainage from the prior I&D site.  She reports \"normal\" BMs daily, no pain with BMs, no know family history of Crohn's or ulcerative colitis. No fecal incontinence. She has had one vaginal birth.   She has never had a colonoscopy.    Physical Examination:  /60   Pulse 62   Wt 147 lb   SpO2 98%   General: Alert, no acute distress    Perianal external examination: Exam was performed by Candelaria Vang NP chaperoned by Jamee Gan, PHILIPPE student and JANUARY Stapleton  Perianal skin: No excoriation or lichenification.  Left posterior scaring and chronic appearing fistula opening present with a small amount of blood. This can be probed toward the anal opening a few cm.  Lesions: No evidence of an external lesion, nodularity, or induration in the perianal region.  Eversion of buttocks: There was not evidence of an anal fissure. Details: N/A.  Skin tags or external hemorrhoids: " None.  Digital rectal examination: Was performed.   Sphincter tone: Good.  Palpable lesions: No.  Other: None.  Bimanual examination: was not performed    Anoscopy: Was performed.   Hemorrhoids: No significant internal hemorrhoids.  Lesions: No  No internal tract opening identified      Assessment/Plan: 39 year old female without a significant past medical history with diagnosis of perianal fistula.     Throughout the visit Ms. Riri Soto appeared to not understand the conversation despite multiple explanations and use of a model.  She continued to ask the same questions multiple times and repeat back inaccurate information after explanation.  Potential misunderstanding due to language barrier and meaning lost during interpretation. Attempted to explain to patient that her symptoms of recurrent perianal abscesses and persistent drainage from the site are likely the result of a fistula.  Discussed surgical intervention with fistulotomy versus Seton drain placement.  Surgery is not necessary but without the treatment she will likely continue to have drainage from the site and potentially develop another abscess that would require another I&D.  She would like to think about the treatment and return and talk with a surgeon, which I think is reasonable as I do not think she fully understood our conversation today despite use of an .       Medical history:  Past Medical History:   Diagnosis Date     Anemia        Surgical history:  No past surgical history on file.    Problem list:    Patient Active Problem List    Diagnosis Date Noted     Labor and delivery, indication for care 06/19/2017     Priority: Medium     Supervision of other normal pregnancy, antepartum 06/19/2017     Priority: Medium     6/19/17- seen on BP with abd pain. GC/ chlam- N/N. UC____, GBS_____         Medications:  Current Outpatient Medications   Medication Sig Dispense Refill     ferrous fumarate (FERRETTS) 324 (106 FE) MG TABS  tablet Take 106 mg by mouth daily       levofloxacin (LEVAQUIN) 750 MG tablet Take 1 tablet (750 mg) by mouth daily (Patient not taking: Reported on 1/30/2020) 5 tablet 0     Prenatal Vit-Fe Fumarate-FA (PRENATAL MULTIVITAMIN  PLUS IRON) 27-0.8 MG TABS per tablet Take 1 tablet by mouth daily       RaNITidine HCl (ZANTAC PO) Take 150 mg by mouth       VITAMIN D, CHOLECALCIFEROL, PO Take 2,000 Units by mouth daily         Allergies:  Allergies   Allergen Reactions     Aluminum Itching       Family history:  No family history on file.    Social history:  Social History     Tobacco Use     Smoking status: Never Smoker     Smokeless tobacco: Never Used   Substance Use Topics     Alcohol use: No    Marital status: single.      Nursing Notes:   Joselito Montenegro, EMT  1/30/2020  9:30 AM  Signed                   Jamee Gan, Student NP on 1/30/2020 at 11:37 AM    Total face to face time was 30 minutes, >50% counseling.    FORTUNATO Astudillo, NP-C  Colon and Rectal Surgery   [facility]    This note was created using speech recognition software and may contain unintended word substitutions.

## 2020-04-02 NOTE — PROGRESS NOTES
Colon and Rectal Surgery Clinic Note    RE: Joan Soto.  : 1980.  JULIEN: 2020.    Reason for visit: Anal fistula.    HPI:   Joan saw Candelaria Martin NP in clinic on 2020 for anorectal fistula.   Two years ago she developed a painful left sided perirectal fluid collection. She underwent I&D of a large left sided ischiorectal abscess with no visibile fistula tract with Dr. Raulito Maciel 17.  Her symptoms improved until 6 months ago when she developed similar perirectal swelling and pain, and required another incision and drainage in the clinic setting. No know family history of Crohn's or ulcerative colitis. No fecal incontinence. She has had one vaginal birth.   She has never had a colonoscopy.  She discussed examination under anesthesia with possible fistulotomy and possible seton placement but Candelaria Martin did not feel the patient fully understood the discussion and recommended she have a follow up visit with me.    Current symptoms include minimal intermittent anal swelling associated with mild clear drainage with occasional blood. Denies fevers or chills. No fecal incontinence or seepage.    Medical history:  Past Medical History:   Diagnosis Date     Anemia        Surgical history:  No past surgical history on file.    Family history:  No family history on file.    Medications:  Current Outpatient Medications   Medication Sig Dispense Refill     ferrous fumarate (FERRETTS) 324 (106 FE) MG TABS tablet Take 106 mg by mouth daily       levofloxacin (LEVAQUIN) 750 MG tablet Take 1 tablet (750 mg) by mouth daily (Patient not taking: Reported on 2020) 5 tablet 0     Prenatal Vit-Fe Fumarate-FA (PRENATAL MULTIVITAMIN  PLUS IRON) 27-0.8 MG TABS per tablet Take 1 tablet by mouth daily       RaNITidine HCl (ZANTAC PO) Take 150 mg by mouth       VITAMIN D, CHOLECALCIFEROL, PO Take 2,000 Units by mouth daily         Allergies:  Allergies   Allergen  Reactions     Aluminum Itching       Social history:   Social History     Tobacco Use     Smoking status: Never Smoker     Smokeless tobacco: Never Used   Substance Use Topics     Alcohol use: No     Marital status: single.    Physical Examination:  /41 (BP Location: Left arm, Patient Position: Sitting, Cuff Size: Adult Regular)   Pulse 63   Temp 98.3  F (36.8  C) (Oral)   SpO2 98%   General: Well hydrated. No acute distress.  Abdomen: Soft, NT. No inguinal adenopathy palpated.  Perianal external examination:  Perianal skin: intact.  Lesions: Yes: left posterior I&D scar with external opening that tracks towards the anus.  Eversion of buttocks: There was not evidence of an anal fissure. Details: N/A.  Skin tags or external hemorrhoids: No.  Digital rectal examination: Was performed.   Sphincter tone: Good.  Palpable lesions: No.  Other: None.  Bimanual examination: was not performed.    Anoscopy: Was performed.   Hemorrhoids: No significant internal hemorrhoids.  Lesions: Yes: anal mucosa irritation at the left posterior aspect of the upper anal canal.    Investigations:  None.    Procedures:  None.    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 Risks, benefits, and alternatives of operative treatment were thoroughly discussed with the patient, he/she understands these well and agrees to proceed.    PLAN  1. Schedule EUA, possible seton vs. fistulotomy in mid May (ASC or [place]). Will need PAC and 2 Fleets.    Time spent: 30 minutes.  >50% spent in discussing, counseling and coordinating care.    Rich Clements M.D., M.Sc.     Division of Colon and Rectal Surgery  [facility]    Referring Provider:  Candelaria Vang, FORTUNATO CNP  [address] [facility] [address]  [place] [address]     Primary Care Provider:  No Ref-Primary, Physician    CC:  Raulito Maciel MD.

## 2020-04-06 ENCOUNTER — OFFICE VISIT (OUTPATIENT)
Dept: SURGERY | Facility: CLINIC | Age: 40
End: 2020-04-06
Payer: COMMERCIAL

## 2020-04-06 VITALS
SYSTOLIC BLOOD PRESSURE: 106 MMHG | HEART RATE: 63 BPM | OXYGEN SATURATION: 98 % | DIASTOLIC BLOOD PRESSURE: 41 MMHG | TEMPERATURE: 98.3 F

## 2020-04-06 DIAGNOSIS — K60.50 ANORECTAL FISTULA: Primary | ICD-10-CM

## 2020-04-06 DIAGNOSIS — K60.30 ANAL FISTULA: ICD-10-CM

## 2020-04-06 NOTE — NURSING NOTE
Chief Complaint   Patient presents with     Consult     Fistula.       Vitals:    04/06/20 1006   BP: 106/41   BP Location: Left arm   Patient Position: Sitting   Cuff Size: Adult Regular   Pulse: 63   Temp: 98.3  F (36.8  C)   TempSrc: Oral   SpO2: 98%       There is no height or weight on file to calculate BMI.      Joselito Montenegro, EMT

## 2020-04-06 NOTE — LETTER
2020       RE: Joan Soto  [address]  [facility] [address]     Dear Colleague,    Thank you for referring your patient, Joan Soto, to the [facility] COLON AND RECTAL SURGERY at [facility]. Please see a copy of my visit note below.    Colon and Rectal Surgery Clinic Note    RE: Joan Soto.  : 1980.  JULIEN: 2020.    Reason for visit: Anal fistula.    HPI:   Joan saw Candelaria Martin NP in clinic on 2020 for anorectal fistula.   Two years ago she developed a painful left sided perirectal fluid collection. She underwent I&D of a large left sided ischiorectal abscess with no visibile fistula tract with Dr. Raulito Maciel 17.  Her symptoms improved until 6 months ago when she developed similar perirectal swelling and pain, and required another incision and drainage in the clinic setting. No know family history of Crohn's or ulcerative colitis. No fecal incontinence. She has had one vaginal birth.   She has never had a colonoscopy.  She discussed examination under anesthesia with possible fistulotomy and possible seton placement but Candelaria Martin did not feel the patient fully understood the discussion and recommended she have a follow up visit with me.    Current symptoms include minimal intermittent anal swelling associated with mild clear drainage with occasional blood. Denies fevers or chills. No fecal incontinence or seepage.    Medical history:  Past Medical History:   Diagnosis Date     Anemia        Surgical history:  No past surgical history on file.    Family history:  No family history on file.    Medications:  Current Outpatient Medications   Medication Sig Dispense Refill     ferrous fumarate (FERRETTS) 324 (106 FE) MG TABS tablet Take 106 mg by mouth daily       levofloxacin (LEVAQUIN) 750 MG tablet Take 1 tablet (750 mg) by mouth daily (Patient not  taking: Reported on 1/30/2020) 5 tablet 0     Prenatal Vit-Fe Fumarate-FA (PRENATAL MULTIVITAMIN  PLUS IRON) 27-0.8 MG TABS per tablet Take 1 tablet by mouth daily       RaNITidine HCl (ZANTAC PO) Take 150 mg by mouth       VITAMIN D, CHOLECALCIFEROL, PO Take 2,000 Units by mouth daily         Allergies:  Allergies   Allergen Reactions     Aluminum Itching       Social history:   Social History     Tobacco Use     Smoking status: Never Smoker     Smokeless tobacco: Never Used   Substance Use Topics     Alcohol use: No     Marital status: single.    Physical Examination:  /41 (BP Location: Left arm, Patient Position: Sitting, Cuff Size: Adult Regular)   Pulse 63   Temp 98.3  F (36.8  C) (Oral)   SpO2 98%   General: Well hydrated. No acute distress.  Abdomen: Soft, NT. No inguinal adenopathy palpated.  Perianal external examination:  Perianal skin: intact.  Lesions: Yes: left posterior I&D scar with external opening that tracks towards the anus.  Eversion of buttocks: There was not evidence of an anal fissure. Details: N/A.  Skin tags or external hemorrhoids: No.  Digital rectal examination: Was performed.   Sphincter tone: Good.  Palpable lesions: No.  Other: None.  Bimanual examination: was not performed.    Anoscopy: Was performed.   Hemorrhoids: No significant internal hemorrhoids.  Lesions: Yes: anal mucosa irritation at the left posterior aspect of the upper anal canal.    Investigations:  None.    Procedures:  None.    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 Risks, benefits, and alternatives of operative treatment were thoroughly discussed with the patient, he/she understands these well and agrees to proceed.    PLAN  1. Schedule EUA, possible seton vs. fistulotomy in mid May (ASC or [place]). Will need PAC and 2 Fleets.    Time spent: 30 minutes.  >50% spent in discussing, counseling and coordinating care.    Rich Clements M.D., M.Sc.   Associate  Professor  Division of Colon and Rectal Surgery  [facility]    Referring Provider:  FORTUNATO Miller CNP  [address] [facility] [address]  [place] [address]     Primary Care Provider:  No Ref-Primary, Physician    CC:  Raulito Maciel MD.

## 2020-04-06 NOTE — PATIENT INSTRUCTIONS
Follow up:  -Schedule for surgery with Dr. Clements  -Will need History and Physical within 1 month of procedure       Please call with any questions or concerns regarding your clinic visit today.    It is a pleasure being involved in your health care.    Contacts post-consultation depending on your need:    Radiology Appointments [phone]    Schedule Clinic Appointments [phone] # 1   M-F 7:30 - 5 pm    [facility] [phone]    Clinic Fax Number [phone]    Surgery Scheduling [phone]    My Chart is available 24 hours a day and is a secure way to access your records and communicate with your care team.  I strongly recommend signing up if you haven't already done so, if you are comfortable with computers.  If you would like to inquire about this or are having problems with My Chart access, you may call [phone] or go online at [email or web address].  Please allow at least 24 hours for a response and extra time on weekends and Holidays.

## 2020-06-05 ENCOUNTER — APPOINTMENT (OUTPATIENT)
Dept: INTERPRETER SERVICES | Facility: CLINIC | Age: 40
End: 2020-06-05

## 2020-06-05 ENCOUNTER — TELEPHONE (OUTPATIENT)
Dept: SURGERY | Facility: CLINIC | Age: 40
End: 2020-06-05

## 2020-06-05 ENCOUNTER — PATIENT OUTREACH (OUTPATIENT)
Dept: SURGERY | Facility: CLINIC | Age: 40
End: 2020-06-05

## 2020-06-05 NOTE — PROGRESS NOTES
Pt is three months pregnant. I called to check in on how severe her symptoms are. She stated last week Monday she had a small amt of drainage but not much. No fevers or pain. Stated she feels comfortable holding off on the surgery if that's what the surgeon prefers. I will relay this info to

## 2020-06-05 NOTE — TELEPHONE ENCOUNTER
I called the patient through  services to discuss scheduling surgery. She is three months pregnant. This writer is not sure what the protocol is for pregnant patients. This information was relayed to care team. Patient is aware she can expect a call back.

## 2020-06-23 ENCOUNTER — APPOINTMENT (OUTPATIENT)
Dept: INTERPRETER SERVICES | Facility: CLINIC | Age: 40
End: 2020-06-23

## 2020-07-03 ENCOUNTER — TELEPHONE (OUTPATIENT)
Dept: SURGERY | Facility: CLINIC | Age: 40
End: 2020-07-03

## 2020-07-03 NOTE — TELEPHONE ENCOUNTER
Per RN Alize, and Lety-op Coordinator Perlita, patient is over 3 months pregnant, and would like to postpone surgery until after she gives birth. This was ok'd by Dr. Clements, but he would like to see patient in clinic again before scheduling. Patient states she'll call us when ready to schedule. Postponing Case Request to 1/15/2021 in-case we have not heard back from patient by then and want to check in with her at that time.

## 2021-03-08 ENCOUNTER — APPOINTMENT (OUTPATIENT)
Dept: INTERPRETER SERVICES | Facility: CLINIC | Age: 41
End: 2021-03-08

## 2025-02-21 ENCOUNTER — HOSPITAL ENCOUNTER (EMERGENCY)
Facility: CLINIC | Age: 45
Discharge: HOME | End: 2025-02-22
Attending: EMERGENCY MEDICINE | Admitting: EMERGENCY MEDICINE
Payer: COMMERCIAL

## 2025-02-21 DIAGNOSIS — R10.32 ABDOMINAL PAIN, LEFT LOWER QUADRANT: ICD-10-CM

## 2025-02-21 DIAGNOSIS — N20.1 URETEROLITHIASIS: ICD-10-CM

## 2025-02-22 ENCOUNTER — APPOINTMENT (OUTPATIENT)
Dept: CT IMAGING | Facility: CLINIC | Age: 45
End: 2025-02-22
Attending: EMERGENCY MEDICINE
Payer: COMMERCIAL

## 2025-02-22 VITALS
TEMPERATURE: 98.4 F | SYSTOLIC BLOOD PRESSURE: 124 MMHG | HEIGHT: 63 IN | RESPIRATION RATE: 16 BRPM | WEIGHT: 130 LBS | OXYGEN SATURATION: 100 % | HEART RATE: 73 BPM | BODY MASS INDEX: 23.04 KG/M2 | DIASTOLIC BLOOD PRESSURE: 80 MMHG

## 2025-02-22 LAB
ALBUMIN SERPL BCG-MCNC: 4.3 G/DL (ref 3.5–5.2)
ALBUMIN UR-MCNC: 100 MG/DL
ALBUMIN UR-MCNC: ABNORMAL MG/DL
ALP SERPL-CCNC: 69 U/L (ref 40–150)
ALT SERPL W P-5'-P-CCNC: 21 U/L (ref 0–50)
AMORPH CRY #/AREA URNS HPF: ABNORMAL /HPF
ANION GAP SERPL CALCULATED.3IONS-SCNC: 13 MMOL/L (ref 7–15)
APPEARANCE UR: ABNORMAL
APPEARANCE UR: CLEAR
AST SERPL W P-5'-P-CCNC: 17 U/L (ref 0–45)
BASOPHILS # BLD AUTO: 0 10E3/UL (ref 0–0.2)
BASOPHILS NFR BLD AUTO: 0 %
BILIRUB SERPL-MCNC: 1.1 MG/DL
BILIRUB UR QL STRIP: ABNORMAL
BILIRUB UR QL STRIP: NEGATIVE
BUN SERPL-MCNC: 19.1 MG/DL (ref 6–20)
CALCIUM SERPL-MCNC: 9.2 MG/DL (ref 8.8–10.4)
CAOX CRY #/AREA URNS HPF: ABNORMAL /HPF
CHLORIDE SERPL-SCNC: 100 MMOL/L (ref 98–107)
COLOR UR AUTO: COLORLESS
COLOR UR AUTO: YELLOW
CREAT SERPL-MCNC: 0.97 MG/DL (ref 0.51–0.95)
EGFRCR SERPLBLD CKD-EPI 2021: 74 ML/MIN/1.73M2
EOSINOPHIL # BLD AUTO: 0 10E3/UL (ref 0–0.7)
EOSINOPHIL NFR BLD AUTO: 0 %
ERYTHROCYTE [DISTWIDTH] IN BLOOD BY AUTOMATED COUNT: 16.2 % (ref 10–15)
GLUCOSE SERPL-MCNC: 147 MG/DL (ref 70–99)
GLUCOSE UR STRIP-MCNC: 50 MG/DL
GLUCOSE UR STRIP-MCNC: NEGATIVE MG/DL
HCG SERPL QL: NEGATIVE
HCO3 SERPL-SCNC: 25 MMOL/L (ref 22–29)
HCT VFR BLD AUTO: 31.7 % (ref 35–47)
HGB BLD-MCNC: 10 G/DL (ref 11.7–15.7)
HGB UR QL STRIP: ABNORMAL
HGB UR QL STRIP: ABNORMAL
IMM GRANULOCYTES # BLD: 0 10E3/UL
IMM GRANULOCYTES NFR BLD: 0 %
KETONES UR STRIP-MCNC: ABNORMAL MG/DL
KETONES UR STRIP-MCNC: NEGATIVE MG/DL
LEUKOCYTE ESTERASE UR QL STRIP: ABNORMAL
LEUKOCYTE ESTERASE UR QL STRIP: ABNORMAL
LIPASE SERPL-CCNC: 24 U/L (ref 13–60)
LYMPHOCYTES # BLD AUTO: 1.2 10E3/UL (ref 0.8–5.3)
LYMPHOCYTES NFR BLD AUTO: 19 %
MCH RBC QN AUTO: 20.8 PG (ref 26.5–33)
MCHC RBC AUTO-ENTMCNC: 31.5 G/DL (ref 31.5–36.5)
MCV RBC AUTO: 66 FL (ref 78–100)
MONOCYTES # BLD AUTO: 0.6 10E3/UL (ref 0–1.3)
MONOCYTES NFR BLD AUTO: 9 %
MUCOUS THREADS #/AREA URNS LPF: PRESENT /LPF
MUCOUS THREADS #/AREA URNS LPF: PRESENT /LPF
NEUTROPHILS # BLD AUTO: 4.5 10E3/UL (ref 1.6–8.3)
NEUTROPHILS NFR BLD AUTO: 71 %
NITRATE UR QL: NEGATIVE
NITRATE UR QL: NEGATIVE
NRBC # BLD AUTO: 0 10E3/UL
NRBC BLD AUTO-RTO: 0 /100
PH UR STRIP: 6 [PH] (ref 5–7)
PH UR STRIP: 6 [PH] (ref 5–7)
PLATELET # BLD AUTO: 158 10E3/UL (ref 150–450)
POTASSIUM SERPL-SCNC: 3.2 MMOL/L (ref 3.4–5.3)
PROT SERPL-MCNC: 7.3 G/DL (ref 6.4–8.3)
RBC # BLD AUTO: 4.81 10E6/UL (ref 3.8–5.2)
RBC URINE: 13 /HPF
RBC URINE: 145 /HPF
SODIUM SERPL-SCNC: 138 MMOL/L (ref 135–145)
SP GR UR STRIP: 1.01 (ref 1–1.03)
SP GR UR STRIP: >=1.03 (ref 1–1.03)
SQUAMOUS EPITHELIAL: 1 /HPF
SQUAMOUS EPITHELIAL: 45 /HPF
UROBILINOGEN UR STRIP-MCNC: 0.2 MG/DL
UROBILINOGEN UR STRIP-MCNC: 3 MG/DL
WBC # BLD AUTO: 6.4 10E3/UL (ref 4–11)
WBC URINE: 123 /HPF
WBC URINE: 13 /HPF

## 2025-02-22 PROCEDURE — 36415 COLL VENOUS BLD VENIPUNCTURE: CPT | Performed by: EMERGENCY MEDICINE

## 2025-02-22 PROCEDURE — 80053 COMPREHEN METABOLIC PANEL: CPT | Performed by: EMERGENCY MEDICINE

## 2025-02-22 PROCEDURE — 83690 ASSAY OF LIPASE: CPT | Performed by: EMERGENCY MEDICINE

## 2025-02-22 PROCEDURE — 258N000003 HC RX IP 258 OP 636: Performed by: EMERGENCY MEDICINE

## 2025-02-22 PROCEDURE — 99284 EMERGENCY DEPT VISIT MOD MDM: CPT | Performed by: EMERGENCY MEDICINE

## 2025-02-22 PROCEDURE — 85025 COMPLETE CBC W/AUTO DIFF WBC: CPT | Performed by: EMERGENCY MEDICINE

## 2025-02-22 PROCEDURE — 84703 CHORIONIC GONADOTROPIN ASSAY: CPT | Performed by: EMERGENCY MEDICINE

## 2025-02-22 PROCEDURE — 74176 CT ABD & PELVIS W/O CONTRAST: CPT

## 2025-02-22 PROCEDURE — 96374 THER/PROPH/DIAG INJ IV PUSH: CPT | Performed by: EMERGENCY MEDICINE

## 2025-02-22 PROCEDURE — 96375 TX/PRO/DX INJ NEW DRUG ADDON: CPT | Performed by: EMERGENCY MEDICINE

## 2025-02-22 PROCEDURE — 81003 URINALYSIS AUTO W/O SCOPE: CPT | Performed by: EMERGENCY MEDICINE

## 2025-02-22 PROCEDURE — 250N000011 HC RX IP 250 OP 636: Mod: JZ | Performed by: EMERGENCY MEDICINE

## 2025-02-22 PROCEDURE — 96361 HYDRATE IV INFUSION ADD-ON: CPT | Performed by: EMERGENCY MEDICINE

## 2025-02-22 PROCEDURE — 99285 EMERGENCY DEPT VISIT HI MDM: CPT | Mod: 25 | Performed by: EMERGENCY MEDICINE

## 2025-02-22 PROCEDURE — 87086 URINE CULTURE/COLONY COUNT: CPT | Performed by: EMERGENCY MEDICINE

## 2025-02-22 RX ORDER — CEFTRIAXONE 1 G/1
1 INJECTION, POWDER, FOR SOLUTION INTRAMUSCULAR; INTRAVENOUS ONCE
Status: COMPLETED | OUTPATIENT
Start: 2025-02-22 | End: 2025-02-22

## 2025-02-22 RX ORDER — OXYCODONE HYDROCHLORIDE 5 MG/1
5 TABLET ORAL EVERY 6 HOURS PRN
Qty: 6 TABLET | Refills: 0 | Status: SHIPPED | OUTPATIENT
Start: 2025-02-22 | End: 2025-02-25

## 2025-02-22 RX ORDER — TAMSULOSIN HYDROCHLORIDE 0.4 MG/1
0.4 CAPSULE ORAL DAILY
Qty: 10 CAPSULE | Refills: 0 | Status: SHIPPED | OUTPATIENT
Start: 2025-02-22 | End: 2025-03-04

## 2025-02-22 RX ORDER — ACETAMINOPHEN 325 MG/1
325-650 TABLET ORAL EVERY 6 HOURS PRN
Qty: 30 TABLET | Refills: 0 | Status: SHIPPED | OUTPATIENT
Start: 2025-02-22

## 2025-02-22 RX ORDER — HYDROMORPHONE HYDROCHLORIDE 1 MG/ML
0.5 INJECTION, SOLUTION INTRAMUSCULAR; INTRAVENOUS; SUBCUTANEOUS ONCE
Status: COMPLETED | OUTPATIENT
Start: 2025-02-22 | End: 2025-02-22

## 2025-02-22 RX ORDER — KETOROLAC TROMETHAMINE 15 MG/ML
15 INJECTION, SOLUTION INTRAMUSCULAR; INTRAVENOUS ONCE
Status: COMPLETED | OUTPATIENT
Start: 2025-02-22 | End: 2025-02-22

## 2025-02-22 RX ORDER — ONDANSETRON 2 MG/ML
8 INJECTION INTRAMUSCULAR; INTRAVENOUS ONCE
Status: COMPLETED | OUTPATIENT
Start: 2025-02-22 | End: 2025-02-22

## 2025-02-22 RX ORDER — IBUPROFEN 600 MG/1
600 TABLET, FILM COATED ORAL EVERY 6 HOURS PRN
Qty: 60 TABLET | Refills: 0 | Status: SHIPPED | OUTPATIENT
Start: 2025-02-22

## 2025-02-22 RX ADMIN — KETOROLAC TROMETHAMINE 15 MG: 15 INJECTION, SOLUTION INTRAMUSCULAR; INTRAVENOUS at 01:33

## 2025-02-22 RX ADMIN — SODIUM CHLORIDE 1000 ML: 9 INJECTION, SOLUTION INTRAVENOUS at 04:07

## 2025-02-22 RX ADMIN — ONDANSETRON 8 MG: 2 INJECTION INTRAMUSCULAR; INTRAVENOUS at 01:30

## 2025-02-22 RX ADMIN — HYDROMORPHONE HYDROCHLORIDE 0.5 MG: 1 INJECTION, SOLUTION INTRAMUSCULAR; INTRAVENOUS; SUBCUTANEOUS at 01:35

## 2025-02-22 ASSESSMENT — ACTIVITIES OF DAILY LIVING (ADL)
ADLS_ACUITY_SCORE: 41

## 2025-02-22 ASSESSMENT — COLUMBIA-SUICIDE SEVERITY RATING SCALE - C-SSRS
6. HAVE YOU EVER DONE ANYTHING, STARTED TO DO ANYTHING, OR PREPARED TO DO ANYTHING TO END YOUR LIFE?: NO
2. HAVE YOU ACTUALLY HAD ANY THOUGHTS OF KILLING YOURSELF IN THE PAST MONTH?: NO
1. IN THE PAST MONTH, HAVE YOU WISHED YOU WERE DEAD OR WISHED YOU COULD GO TO SLEEP AND NOT WAKE UP?: NO

## 2025-02-22 NOTE — ED NOTES
Staff attempted to straight cath pt as ordered. However, resistance was met, and some blood was noted. MD was notified and new orders were received.

## 2025-02-22 NOTE — DISCHARGE INSTRUCTIONS
Return to the emergency department if you develop pain that does not go away for 2 hours, fevers, uncontrollable vomiting or if you have any further concerns.    You can try taking Tylenol and ibuprofen first for pain.  If your pain does not go away you can try an oxycodone    I have made a referral for you to see urology.  Please call the phone number in this discharge paperwork in 1 to 2 days if you do not hear from a

## 2025-02-22 NOTE — ED PROVIDER NOTES
"  History     Chief Complaint   Patient presents with    Flank Pain     L side pain from the left abdominal quadrant to the left sasha area. Sudden onset pain that started this morning. Pain level 10/10. Took tylenol around 2000 with no relief.     Nausea & Vomiting     Endorsing N/V all started this morning. Couple episodes of vomiting.     Urinary Frequency     Pt reports the urge and frequency but able to void just a little.      HPI  Joan Soto is a 44 year old female with PMH notable for delta-beta thalassemia  who presents to the ED with left side abdominal pain.  Symptoms started around 10 this morning.  She was also feeling nausea and had 5 episodes of vomiting around that time.  Patient reports presenting to another emergency department where she had some blood tests and was ultimately discharged.  She felt somewhat better through the afternoon and had recurrence of similar pains around 7 PM.  She has had a couple episodes more vomiting.  No diarrhea nor difficulty passing bowel movements.  She is having urinary frequency with small amounts, and no dysuria nor hematuria.  No history of kidney stones.     Physical Exam   BP: 124/80  Pulse: 73  Temp: 98.4  F (36.9  C)  Resp: 16  Height: 160 cm (5' 3\")  Weight: 59 kg (130 lb)  SpO2: 100 %    Physical Exam  General: Appears uncomfortable. Appears stated age.   HENT: MMM, no oropharyngeal lesions  Eyes: PERRL, normal sclerae   Cardio: Regular rate. Regular rhythm. Extremities well perfused  Resp: Normal work of breathing, Normal respiratory rate.   Abdomen: LUQ tenderness, non-distended, no rebound, no guarding.  Left CVA tenderness present, no right CVA tenderness  Neuro: alert without signs of confusion. CN II-XII grossly intact. Grossly normal strength and sensation in all extremities.   Psych: normal affect, normal behavior      ED Course      Procedures              Labs Ordered and Resulted from Time of ED Arrival to Time of ED Departure " - No data to display  CT Abdomen Pelvis w/o Contrast   Final Result   IMPRESSION:    1.  3 mm obstructing stone distal left ureter resulting in mild left hydronephrosis.      2.  No other urinary calculi and no acute findings elsewhere.              Medical Decision Making  The patient's presentation was of high complexity (an acute health issue posing potential threat to life or bodily function).    The patient's evaluation involved:  review of external note(s) from 1 sources ([facility] ED yesterday)  review of 3+ test result(s) ordered prior to this encounter (labs from [facility])  ordering and/or review of 3+ test(s) in this encounter (see separate area of note for details)  independent interpretation of testing performed by another health professional (CT)    The patient's management necessitated high risk (a parenteral controlled substance).      Assessments & Plan   Patient presenting with left-sided abdominal pain and vomiting, associated urinary frequency and small amounts. Vitals in the ED unremarkable. Nursing notes reviewed. Initial differential diagnosis includes but not limited to pyelonephritis, nephrolithiasis, gastritis, intestinal colic, pancreatitis.     Records from [facility] reviewed, patient had CBC, BMP, hCG, and UA without microscopic performed this morning which were unremarkable and patient was subsequently discharged home.      Here, CT abdomen/pelvis demonstrates 3 mm obstructing stone in the distal left ureter.  Initial UA concerning for potential UTI with large leukocyte esterase and 123 WBCs, but did also have 45 squamous cells as evidence of being a contaminated sample.  Cath sample attempted but patient initially without further urine.  NS bolus given.  Cath UA then obtained and sent.    In the ED, the patient's symptoms were managed with hydromorphone, ketorolac, and ondansetron, with improvement in symptoms upon reassessment.     Patient signed out to oncoming ED  provider with plan to follow-up cath UA sample, if showing signs of UTI then antibiotics and urology consult, if not showing signs of UTI then discharged home with strainer and pain medications and urology follow-up for ureterolithiasis.      Final diagnoses:   Ureterolithiasis   Abdominal pain, left lower quadrant     New Prescriptions    No medications on file     --  Rick Madden MD   Emergency Medicine   [facility] EMERGENCY DEPARTMENT  2/21/2025       Rick Madden MD  02/22/25 0727

## 2025-02-22 NOTE — ED PROVIDER NOTES
Emergency Department I-PASS Sign-out      Illness Severity: Stable    Patient Summary:  44 year old female with pertinent PMH of n/a who presented with LLQ pain, urinary frequency.     ED Course/treatment plan: CT showed stone in ureter. Initial UA c/w UTI but also had many squams, plan cath sample.     Clinical Impression:  No diagnosis found.    Edited by: Rick Madden MD at 2/22/2025 0624    Action List:  -To do:  Labs: cath UA  - if repeat UA positive then urology consult and ceftri    Situational Awareness & Contingency Planning:  Code Status (Most recent):  No Order    Disposition:  to be determined    Edited by: Rick Madden MD at 2/22/2025 0624    Synthesis & Events after sign-out:  Repeat urinalysis is much improved.  There is a small amount of white blood cells however less likely infectious patient does not have fevers, significant dysuria, chills, sweats.  She is stable with no elevation of white blood count.  Discussed return precautions.  Pain medication sent to her pharmacy in addition to Flomax and urology consult patient is given        Ajay Barragan MD   Emergency Medicine     Ajay Barragan MD  02/22/25 0704

## 2025-02-23 LAB
BACTERIA UR CULT: NO GROWTH
BACTERIA UR CULT: NORMAL

## 2025-09-02 ENCOUNTER — APPOINTMENT (OUTPATIENT)
Dept: CT IMAGING | Facility: CLINIC | Age: 45
End: 2025-09-02
Attending: PHYSICIAN ASSISTANT
Payer: COMMERCIAL

## 2025-09-02 ENCOUNTER — HOSPITAL ENCOUNTER (OUTPATIENT)
Facility: CLINIC | Age: 45
Setting detail: OBSERVATION
Discharge: HOME | End: 2025-09-03
Attending: EMERGENCY MEDICINE | Admitting: STUDENT IN AN ORGANIZED HEALTH CARE EDUCATION/TRAINING PROGRAM
Payer: COMMERCIAL

## 2025-09-02 DIAGNOSIS — K62.89 PERIANAL PAIN: ICD-10-CM

## 2025-09-02 DIAGNOSIS — K61.0 PERIANAL ABSCESS: Primary | ICD-10-CM

## 2025-09-02 LAB
ALBUMIN SERPL BCG-MCNC: 4.2 G/DL (ref 3.5–5.2)
ALP SERPL-CCNC: 82 U/L (ref 40–150)
ALT SERPL W P-5'-P-CCNC: 85 U/L (ref 0–50)
ANION GAP SERPL CALCULATED.3IONS-SCNC: 10 MMOL/L (ref 7–15)
AST SERPL W P-5'-P-CCNC: 43 U/L (ref 0–45)
BASOPHILS # BLD AUTO: <0.03 10E3/UL (ref 0–0.2)
BASOPHILS NFR BLD AUTO: 0 %
BILIRUB SERPL-MCNC: 0.6 MG/DL
BUN SERPL-MCNC: 21.3 MG/DL (ref 6–20)
CALCIUM SERPL-MCNC: 8.7 MG/DL (ref 8.8–10.4)
CHLORIDE SERPL-SCNC: 101 MMOL/L (ref 98–107)
CREAT SERPL-MCNC: 0.69 MG/DL (ref 0.51–0.95)
CRP SERPL-MCNC: <3 MG/L
EGFRCR SERPLBLD CKD-EPI 2021: >90 ML/MIN/1.73M2
EOSINOPHIL # BLD AUTO: 0.06 10E3/UL (ref 0–0.7)
EOSINOPHIL NFR BLD AUTO: 1 %
ERYTHROCYTE [DISTWIDTH] IN BLOOD BY AUTOMATED COUNT: 16.5 % (ref 10–15)
ERYTHROCYTE [SEDIMENTATION RATE] IN BLOOD BY WESTERGREN METHOD: 59 MM/HR (ref 0–20)
GLUCOSE SERPL-MCNC: 107 MG/DL (ref 70–99)
HCG SERPL QL: NEGATIVE
HCO3 SERPL-SCNC: 25 MMOL/L (ref 22–29)
HCT VFR BLD AUTO: 30.2 % (ref 35–47)
HGB BLD-MCNC: 9.7 G/DL (ref 11.7–15.7)
IMM GRANULOCYTES # BLD: <0.03 10E3/UL
IMM GRANULOCYTES NFR BLD: 0.2 %
LACTATE SERPL-SCNC: 0.8 MMOL/L (ref 0.7–2)
LYMPHOCYTES # BLD AUTO: 1.68 10E3/UL (ref 0.8–5.3)
LYMPHOCYTES NFR BLD AUTO: 27 %
MCH RBC QN AUTO: 21 PG (ref 26.5–33)
MCHC RBC AUTO-ENTMCNC: 32.1 G/DL (ref 31.5–36.5)
MCV RBC AUTO: 65.5 FL (ref 78–100)
MONOCYTES # BLD AUTO: 0.53 10E3/UL (ref 0–1.3)
MONOCYTES NFR BLD AUTO: 8.5 %
NEUTROPHILS # BLD AUTO: 3.95 10E3/UL (ref 1.6–8.3)
NEUTROPHILS NFR BLD AUTO: 63.3 %
NRBC # BLD AUTO: <0.03 10E3/UL
NRBC BLD AUTO-RTO: 0 /100
PLATELET # BLD AUTO: 180 10E3/UL (ref 150–450)
POTASSIUM SERPL-SCNC: 3.8 MMOL/L (ref 3.4–5.3)
PROT SERPL-MCNC: 7 G/DL (ref 6.4–8.3)
RBC # BLD AUTO: 4.61 10E6/UL (ref 3.8–5.2)
SODIUM SERPL-SCNC: 136 MMOL/L (ref 135–145)
WBC # BLD AUTO: 6.23 10E3/UL (ref 4–11)

## 2025-09-02 PROCEDURE — 250N000009 HC RX 250: Performed by: PHYSICIAN ASSISTANT

## 2025-09-02 PROCEDURE — 85652 RBC SED RATE AUTOMATED: CPT | Performed by: PHYSICIAN ASSISTANT

## 2025-09-02 PROCEDURE — 255N000002 HC RX 255 OP 636: Performed by: PHYSICIAN ASSISTANT

## 2025-09-02 PROCEDURE — 36415 COLL VENOUS BLD VENIPUNCTURE: CPT | Performed by: PHYSICIAN ASSISTANT

## 2025-09-02 PROCEDURE — 85041 AUTOMATED RBC COUNT: CPT | Performed by: PHYSICIAN ASSISTANT

## 2025-09-02 PROCEDURE — 83605 ASSAY OF LACTIC ACID: CPT | Performed by: PHYSICIAN ASSISTANT

## 2025-09-02 PROCEDURE — 86140 C-REACTIVE PROTEIN: CPT | Performed by: PHYSICIAN ASSISTANT

## 2025-09-02 PROCEDURE — 80053 COMPREHEN METABOLIC PANEL: CPT | Performed by: PHYSICIAN ASSISTANT

## 2025-09-02 PROCEDURE — 84703 CHORIONIC GONADOTROPIN ASSAY: CPT | Performed by: PHYSICIAN ASSISTANT

## 2025-09-02 PROCEDURE — 74177 CT ABD & PELVIS W/CONTRAST: CPT

## 2025-09-02 PROCEDURE — 250N000011 HC RX IP 250 OP 636: Performed by: PHYSICIAN ASSISTANT

## 2025-09-02 PROCEDURE — 258N000003 HC RX IP 258 OP 636: Performed by: PHYSICIAN ASSISTANT

## 2025-09-02 RX ORDER — KETOROLAC TROMETHAMINE 15 MG/ML
15 INJECTION, SOLUTION INTRAMUSCULAR; INTRAVENOUS ONCE
Status: COMPLETED | OUTPATIENT
Start: 2025-09-02 | End: 2025-09-02

## 2025-09-02 RX ADMIN — IOHEXOL 79 ML: 350 INJECTION, SOLUTION INTRAVENOUS at 23:20

## 2025-09-02 RX ADMIN — SODIUM CHLORIDE 60 ML: 9 INJECTION, SOLUTION INTRAVENOUS at 23:33

## 2025-09-02 RX ADMIN — SODIUM CHLORIDE 1000 ML: 0.9 INJECTION, SOLUTION INTRAVENOUS at 22:39

## 2025-09-02 RX ADMIN — KETOROLAC TROMETHAMINE 15 MG: 15 INJECTION, SOLUTION INTRAMUSCULAR; INTRAVENOUS at 22:39

## 2025-09-02 ASSESSMENT — COLUMBIA-SUICIDE SEVERITY RATING SCALE - C-SSRS
1. IN THE PAST MONTH, HAVE YOU WISHED YOU WERE DEAD OR WISHED YOU COULD GO TO SLEEP AND NOT WAKE UP?: NO
2. HAVE YOU ACTUALLY HAD ANY THOUGHTS OF KILLING YOURSELF IN THE PAST MONTH?: NO
6. HAVE YOU EVER DONE ANYTHING, STARTED TO DO ANYTHING, OR PREPARED TO DO ANYTHING TO END YOUR LIFE?: NO

## 2025-09-02 ASSESSMENT — ACTIVITIES OF DAILY LIVING (ADL)
ADLS_ACUITY_SCORE: 41
ADLS_ACUITY_SCORE: 41

## 2025-09-03 ENCOUNTER — ANESTHESIA (OUTPATIENT)
Dept: SURGERY | Facility: CLINIC | Age: 45
End: 2025-09-03
Payer: COMMERCIAL

## 2025-09-03 ENCOUNTER — ANESTHESIA EVENT (OUTPATIENT)
Dept: SURGERY | Facility: CLINIC | Age: 45
End: 2025-09-03
Payer: COMMERCIAL

## 2025-09-03 ENCOUNTER — VIRTUAL VISIT (OUTPATIENT)
Dept: INTERPRETER SERVICES | Facility: CLINIC | Age: 45
End: 2025-09-03
Payer: COMMERCIAL

## 2025-09-03 VITALS
OXYGEN SATURATION: 100 % | DIASTOLIC BLOOD PRESSURE: 64 MMHG | RESPIRATION RATE: 16 BRPM | TEMPERATURE: 98.9 F | HEART RATE: 64 BPM | SYSTOLIC BLOOD PRESSURE: 107 MMHG

## 2025-09-03 PROBLEM — K61.0 PERIANAL ABSCESS: Status: ACTIVE | Noted: 2025-09-03

## 2025-09-03 LAB
GLUCOSE BLDC GLUCOMTR-MCNC: 93 MG/DL (ref 70–99)
GLUCOSE BLDC GLUCOMTR-MCNC: 97 MG/DL (ref 70–99)

## 2025-09-03 PROCEDURE — 258N000003 HC RX IP 258 OP 636: Performed by: PHYSICIAN ASSISTANT

## 2025-09-03 PROCEDURE — 370N000017 HC ANESTHESIA TECHNICAL FEE, PER MIN: Performed by: STUDENT IN AN ORGANIZED HEALTH CARE EDUCATION/TRAINING PROGRAM

## 2025-09-03 PROCEDURE — 250N000011 HC RX IP 250 OP 636: Performed by: EMERGENCY MEDICINE

## 2025-09-03 PROCEDURE — 99285 EMERGENCY DEPT VISIT HI MDM: CPT | Mod: 25 | Performed by: EMERGENCY MEDICINE

## 2025-09-03 PROCEDURE — 272N000001 HC OR GENERAL SUPPLY STERILE: Performed by: STUDENT IN AN ORGANIZED HEALTH CARE EDUCATION/TRAINING PROGRAM

## 2025-09-03 PROCEDURE — 250N000011 HC RX IP 250 OP 636: Performed by: NURSE ANESTHETIST, CERTIFIED REGISTERED

## 2025-09-03 PROCEDURE — 360N000075 HC SURGERY LEVEL 2, PER MIN: Performed by: STUDENT IN AN ORGANIZED HEALTH CARE EDUCATION/TRAINING PROGRAM

## 2025-09-03 PROCEDURE — 82962 GLUCOSE BLOOD TEST: CPT

## 2025-09-03 PROCEDURE — 999N000141 HC STATISTIC PRE-PROCEDURE NURSING ASSESSMENT: Performed by: STUDENT IN AN ORGANIZED HEALTH CARE EDUCATION/TRAINING PROGRAM

## 2025-09-03 PROCEDURE — 710N000012 HC RECOVERY PHASE 2, PER MINUTE: Performed by: STUDENT IN AN ORGANIZED HEALTH CARE EDUCATION/TRAINING PROGRAM

## 2025-09-03 PROCEDURE — T1013 SIGN LANG/ORAL INTERPRETER: HCPCS | Mod: U4,TEL,95

## 2025-09-03 PROCEDURE — 360N000074 HC SURGERY LEVEL 1, PER MIN: Performed by: STUDENT IN AN ORGANIZED HEALTH CARE EDUCATION/TRAINING PROGRAM

## 2025-09-03 PROCEDURE — 99285 EMERGENCY DEPT VISIT HI MDM: CPT | Mod: FS | Performed by: EMERGENCY MEDICINE

## 2025-09-03 PROCEDURE — 250N000013 HC RX MED GY IP 250 OP 250 PS 637: Performed by: STUDENT IN AN ORGANIZED HEALTH CARE EDUCATION/TRAINING PROGRAM

## 2025-09-03 PROCEDURE — 250N000011 HC RX IP 250 OP 636: Performed by: PHYSICIAN ASSISTANT

## 2025-09-03 PROCEDURE — 250N000011 HC RX IP 250 OP 636: Performed by: STUDENT IN AN ORGANIZED HEALTH CARE EDUCATION/TRAINING PROGRAM

## 2025-09-03 PROCEDURE — G0378 HOSPITAL OBSERVATION PER HR: HCPCS

## 2025-09-03 PROCEDURE — 258N000003 HC RX IP 258 OP 636: Performed by: NURSE ANESTHETIST, CERTIFIED REGISTERED

## 2025-09-03 RX ORDER — ONDANSETRON 4 MG/1
4 TABLET, ORALLY DISINTEGRATING ORAL EVERY 6 HOURS PRN
Status: DISCONTINUED | OUTPATIENT
Start: 2025-09-03 | End: 2025-09-03 | Stop reason: HOSPADM

## 2025-09-03 RX ORDER — ONDANSETRON 2 MG/ML
4 INJECTION INTRAMUSCULAR; INTRAVENOUS EVERY 6 HOURS PRN
Status: DISCONTINUED | OUTPATIENT
Start: 2025-09-03 | End: 2025-09-03 | Stop reason: HOSPADM

## 2025-09-03 RX ORDER — FENTANYL CITRATE 50 UG/ML
INJECTION, SOLUTION INTRAMUSCULAR; INTRAVENOUS PRN
Status: DISCONTINUED | OUTPATIENT
Start: 2025-09-03 | End: 2025-09-03

## 2025-09-03 RX ORDER — IBUPROFEN 800 MG/1
800 TABLET, FILM COATED ORAL 3 TIMES DAILY
Qty: 42 TABLET | Refills: 0 | Status: SHIPPED | OUTPATIENT
Start: 2025-09-03 | End: 2025-09-17

## 2025-09-03 RX ORDER — ONDANSETRON 2 MG/ML
4 INJECTION INTRAMUSCULAR; INTRAVENOUS ONCE
Status: DISCONTINUED | OUTPATIENT
Start: 2025-09-03 | End: 2025-09-03 | Stop reason: HOSPADM

## 2025-09-03 RX ORDER — OXYCODONE HYDROCHLORIDE 5 MG/1
5 TABLET ORAL EVERY 6 HOURS PRN
Qty: 12 TABLET | Refills: 0 | Status: SHIPPED | OUTPATIENT
Start: 2025-09-03

## 2025-09-03 RX ORDER — PROPOFOL 10 MG/ML
INJECTION, EMULSION INTRAVENOUS PRN
Status: DISCONTINUED | OUTPATIENT
Start: 2025-09-03 | End: 2025-09-03

## 2025-09-03 RX ORDER — PROPOFOL 10 MG/ML
INJECTION, EMULSION INTRAVENOUS CONTINUOUS PRN
Status: DISCONTINUED | OUTPATIENT
Start: 2025-09-03 | End: 2025-09-03

## 2025-09-03 RX ORDER — ACETAMINOPHEN 500 MG
1000 TABLET ORAL
Status: DISCONTINUED | OUTPATIENT
Start: 2025-09-03 | End: 2025-09-03 | Stop reason: HOSPADM

## 2025-09-03 RX ORDER — SODIUM CHLORIDE, SODIUM LACTATE, POTASSIUM CHLORIDE, CALCIUM CHLORIDE 600; 310; 30; 20 MG/100ML; MG/100ML; MG/100ML; MG/100ML
INJECTION, SOLUTION INTRAVENOUS CONTINUOUS PRN
Status: DISCONTINUED | OUTPATIENT
Start: 2025-09-03 | End: 2025-09-03

## 2025-09-03 RX ORDER — PIPERACILLIN SODIUM, TAZOBACTAM SODIUM 3; .375 G/15ML; G/15ML
3.38 INJECTION, POWDER, LYOPHILIZED, FOR SOLUTION INTRAVENOUS EVERY 6 HOURS
Status: DISCONTINUED | OUTPATIENT
Start: 2025-09-03 | End: 2025-09-03 | Stop reason: HOSPADM

## 2025-09-03 RX ORDER — PIPERACILLIN SODIUM, TAZOBACTAM SODIUM 4; .5 G/20ML; G/20ML
4.5 INJECTION, POWDER, LYOPHILIZED, FOR SOLUTION INTRAVENOUS ONCE
Status: COMPLETED | OUTPATIENT
Start: 2025-09-03 | End: 2025-09-03

## 2025-09-03 RX ORDER — ACETAMINOPHEN 500 MG
1000 TABLET ORAL 4 TIMES DAILY
Qty: 100 TABLET | Refills: 0 | Status: SHIPPED | OUTPATIENT
Start: 2025-09-03 | End: 2025-09-17

## 2025-09-03 RX ORDER — PIPERACILLIN SODIUM, TAZOBACTAM SODIUM 3; .375 G/15ML; G/15ML
3.38 INJECTION, POWDER, LYOPHILIZED, FOR SOLUTION INTRAVENOUS EVERY 6 HOURS
Status: DISCONTINUED | OUTPATIENT
Start: 2025-09-03 | End: 2025-09-03

## 2025-09-03 RX ORDER — BUPIVACAINE HYDROCHLORIDE 5 MG/ML
INJECTION, SOLUTION PERINEURAL PRN
Status: DISCONTINUED | OUTPATIENT
Start: 2025-09-03 | End: 2025-09-03 | Stop reason: HOSPADM

## 2025-09-03 RX ORDER — SODIUM CHLORIDE, SODIUM LACTATE, POTASSIUM CHLORIDE, CALCIUM CHLORIDE 600; 310; 30; 20 MG/100ML; MG/100ML; MG/100ML; MG/100ML
INJECTION, SOLUTION INTRAVENOUS CONTINUOUS
Status: DISCONTINUED | OUTPATIENT
Start: 2025-09-03 | End: 2025-09-03 | Stop reason: HOSPADM

## 2025-09-03 RX ORDER — OXYCODONE HYDROCHLORIDE 5 MG/1
5 TABLET ORAL
Status: COMPLETED | OUTPATIENT
Start: 2025-09-03 | End: 2025-09-03

## 2025-09-03 RX ADMIN — PROPOFOL 40 MG: 10 INJECTION, EMULSION INTRAVENOUS at 12:24

## 2025-09-03 RX ADMIN — OXYCODONE HYDROCHLORIDE 5 MG: 5 TABLET ORAL at 13:19

## 2025-09-03 RX ADMIN — PROPOFOL 120 MCG/KG/MIN: 10 INJECTION, EMULSION INTRAVENOUS at 12:10

## 2025-09-03 RX ADMIN — PIPERACILLIN AND TAZOBACTAM 4.5 G: 4; .5 INJECTION, POWDER, LYOPHILIZED, FOR SOLUTION INTRAVENOUS at 01:07

## 2025-09-03 RX ADMIN — SODIUM CHLORIDE, SODIUM LACTATE, POTASSIUM CHLORIDE, AND CALCIUM CHLORIDE: .6; .31; .03; .02 INJECTION, SOLUTION INTRAVENOUS at 11:50

## 2025-09-03 RX ADMIN — SODIUM CHLORIDE, SODIUM LACTATE, POTASSIUM CHLORIDE, AND CALCIUM CHLORIDE: .6; .31; .03; .02 INJECTION, SOLUTION INTRAVENOUS at 08:58

## 2025-09-03 RX ADMIN — MIDAZOLAM 1 MG: 1 INJECTION INTRAMUSCULAR; INTRAVENOUS at 11:58

## 2025-09-03 RX ADMIN — MIDAZOLAM 1 MG: 1 INJECTION INTRAMUSCULAR; INTRAVENOUS at 12:10

## 2025-09-03 RX ADMIN — PIPERACILLIN AND TAZOBACTAM 3.38 G: 3; .375 INJECTION, POWDER, FOR SOLUTION INTRAVENOUS at 12:40

## 2025-09-03 RX ADMIN — PROPOFOL 20 MG: 10 INJECTION, EMULSION INTRAVENOUS at 12:21

## 2025-09-03 RX ADMIN — PIPERACILLIN AND TAZOBACTAM 3.38 G: 3; .375 INJECTION, POWDER, LYOPHILIZED, FOR SOLUTION INTRAVENOUS at 06:25

## 2025-09-03 RX ADMIN — ACETAMINOPHEN 1000 MG: 500 TABLET ORAL at 13:47

## 2025-09-03 RX ADMIN — FENTANYL CITRATE 50 MCG: 50 INJECTION INTRAMUSCULAR; INTRAVENOUS at 12:10

## 2025-09-03 RX ADMIN — FENTANYL CITRATE 25 MCG: 50 INJECTION INTRAMUSCULAR; INTRAVENOUS at 12:52

## 2025-09-03 ASSESSMENT — ACTIVITIES OF DAILY LIVING (ADL)
ADLS_ACUITY_SCORE: 41

## 2025-09-04 ENCOUNTER — TELEPHONE (OUTPATIENT)
Dept: SURGERY | Facility: CLINIC | Age: 45
End: 2025-09-04
Payer: COMMERCIAL

## (undated) DEVICE — SUCTION MANIFOLD NEPTUNE 2 SYS 4 PORT 0702-020-000

## (undated) DEVICE — CUP AND LID 2PK 2OZ STERILE  SSK9006A

## (undated) DEVICE — SU SILK 0 TIE 6X30" A306H

## (undated) DEVICE — SOL WATER IRRIG 1000ML BOTTLE 2F7114

## (undated) DEVICE — PANTIES MESH LG/XLG 2PK 706M2

## (undated) DEVICE — PACK RECTAL UMMC

## (undated) DEVICE — LINEN TOWEL PACK X5 5464

## (undated) DEVICE — PAD CHUX UNDERPAD 23X36" 676105

## (undated) DEVICE — VESSEL LOOPS YELLOW MAXI 31145694

## (undated) DEVICE — JELLY LUBRICATING SURGILUBE 2OZ TUBE

## (undated) DEVICE — GLOVE PROTEXIS POWDER FREE ORANGE 6.5  2D72PT65X

## (undated) DEVICE — ESU GROUND PAD ADULT W/CORD E7507

## (undated) RX ORDER — ACETAMINOPHEN 500 MG
TABLET ORAL
Status: DISPENSED
Start: 2025-09-03

## (undated) RX ORDER — BUPIVACAINE HYDROCHLORIDE 5 MG/ML
INJECTION, SOLUTION EPIDURAL; INTRACAUDAL; PERINEURAL
Status: DISPENSED
Start: 2025-09-03

## (undated) RX ORDER — OXYCODONE HYDROCHLORIDE 5 MG/1
TABLET ORAL
Status: DISPENSED
Start: 2025-09-03

## (undated) RX ORDER — FENTANYL CITRATE 50 UG/ML
INJECTION, SOLUTION INTRAMUSCULAR; INTRAVENOUS
Status: DISPENSED
Start: 2025-09-03

## (undated) RX ORDER — PIPERACILLIN SODIUM, TAZOBACTAM SODIUM 3; .375 G/15ML; G/15ML
INJECTION, POWDER, LYOPHILIZED, FOR SOLUTION INTRAVENOUS
Status: DISPENSED
Start: 2025-09-03

## (undated) RX ORDER — ACETAMINOPHEN 325 MG/1
TABLET ORAL
Status: DISPENSED
Start: 2025-09-03